# Patient Record
Sex: MALE | Race: WHITE | NOT HISPANIC OR LATINO | Employment: FULL TIME | URBAN - METROPOLITAN AREA
[De-identification: names, ages, dates, MRNs, and addresses within clinical notes are randomized per-mention and may not be internally consistent; named-entity substitution may affect disease eponyms.]

---

## 2020-06-26 ENCOUNTER — HOSPITAL ENCOUNTER (INPATIENT)
Facility: HOSPITAL | Age: 55
LOS: 3 days | Discharge: HOME | DRG: 310 | End: 2020-06-29
Attending: EMERGENCY MEDICINE | Admitting: INTERNAL MEDICINE
Payer: COMMERCIAL

## 2020-06-26 ENCOUNTER — APPOINTMENT (EMERGENCY)
Dept: RADIOLOGY | Facility: HOSPITAL | Age: 55
DRG: 310 | End: 2020-06-26
Payer: COMMERCIAL

## 2020-06-26 ENCOUNTER — OFFICE VISIT (OUTPATIENT)
Dept: URGENT CARE | Facility: CLINIC | Age: 55
End: 2020-06-26
Payer: COMMERCIAL

## 2020-06-26 VITALS
HEART RATE: 156 BPM | TEMPERATURE: 96.3 F | SYSTOLIC BLOOD PRESSURE: 138 MMHG | DIASTOLIC BLOOD PRESSURE: 77 MMHG | RESPIRATION RATE: 22 BRPM | OXYGEN SATURATION: 98 %

## 2020-06-26 DIAGNOSIS — I48.91 RAPID ATRIAL FIBRILLATION (HCC): Primary | ICD-10-CM

## 2020-06-26 DIAGNOSIS — R11.0 NAUSEA: ICD-10-CM

## 2020-06-26 DIAGNOSIS — R42 DIZZINESS: ICD-10-CM

## 2020-06-26 DIAGNOSIS — I48.91 ATRIAL FIBRILLATION WITH RAPID VENTRICULAR RESPONSE (HCC): Primary | ICD-10-CM

## 2020-06-26 PROBLEM — R79.89 ELEVATED SERUM CREATININE: Status: ACTIVE | Noted: 2020-06-26

## 2020-06-26 PROBLEM — I10 HYPERTENSION: Status: ACTIVE | Noted: 2020-06-26

## 2020-06-26 PROBLEM — E78.5 HYPERLIPIDEMIA: Status: ACTIVE | Noted: 2020-06-26

## 2020-06-26 PROBLEM — D72.829 LEUKOCYTOSIS: Status: ACTIVE | Noted: 2020-06-26

## 2020-06-26 LAB
ALBUMIN SERPL BCP-MCNC: 4.4 G/DL (ref 3.5–5)
ALP SERPL-CCNC: 78 U/L (ref 46–116)
ALT SERPL W P-5'-P-CCNC: 45 U/L (ref 12–78)
AMPHETAMINES SERPL QL SCN: NEGATIVE
ANION GAP SERPL CALCULATED.3IONS-SCNC: 13 MMOL/L (ref 4–13)
APTT PPP: 25 SECONDS (ref 23–37)
AST SERPL W P-5'-P-CCNC: 21 U/L (ref 5–45)
BACTERIA UR QL AUTO: ABNORMAL /HPF
BARBITURATES UR QL: NEGATIVE
BASOPHILS # BLD AUTO: 0.04 THOUSANDS/ΜL (ref 0–0.1)
BASOPHILS NFR BLD AUTO: 0 % (ref 0–1)
BENZODIAZ UR QL: NEGATIVE
BILIRUB SERPL-MCNC: 1.3 MG/DL (ref 0.2–1)
BILIRUB UR QL STRIP: NEGATIVE
BUN SERPL-MCNC: 23 MG/DL (ref 5–25)
CALCIUM SERPL-MCNC: 9.4 MG/DL (ref 8.3–10.1)
CHLORIDE SERPL-SCNC: 105 MMOL/L (ref 100–108)
CLARITY UR: CLEAR
CO2 SERPL-SCNC: 24 MMOL/L (ref 21–32)
COCAINE UR QL: NEGATIVE
COLOR UR: YELLOW
CREAT SERPL-MCNC: 1.66 MG/DL (ref 0.6–1.3)
EOSINOPHIL # BLD AUTO: 0.11 THOUSAND/ΜL (ref 0–0.61)
EOSINOPHIL NFR BLD AUTO: 1 % (ref 0–6)
ERYTHROCYTE [DISTWIDTH] IN BLOOD BY AUTOMATED COUNT: 12.4 % (ref 11.6–15.1)
ETHANOL SERPL-MCNC: <3 MG/DL (ref 0–3)
GFR SERPL CREATININE-BSD FRML MDRD: 47 ML/MIN/1.73SQ M
GLUCOSE SERPL-MCNC: 150 MG/DL (ref 65–140)
GLUCOSE UR STRIP-MCNC: NEGATIVE MG/DL
HCT VFR BLD AUTO: 48.7 % (ref 36.5–49.3)
HGB BLD-MCNC: 16.2 G/DL (ref 12–17)
HGB UR QL STRIP.AUTO: ABNORMAL
HYALINE CASTS #/AREA URNS LPF: ABNORMAL /LPF
IMM GRANULOCYTES # BLD AUTO: 0.1 THOUSAND/UL (ref 0–0.2)
IMM GRANULOCYTES NFR BLD AUTO: 1 % (ref 0–2)
INR PPP: 0.98 (ref 0.84–1.19)
KETONES UR STRIP-MCNC: ABNORMAL MG/DL
LEUKOCYTE ESTERASE UR QL STRIP: NEGATIVE
LYMPHOCYTES # BLD AUTO: 2.4 THOUSANDS/ΜL (ref 0.6–4.47)
LYMPHOCYTES NFR BLD AUTO: 17 % (ref 14–44)
MAGNESIUM SERPL-MCNC: 2 MG/DL (ref 1.6–2.6)
MCH RBC QN AUTO: 29.4 PG (ref 26.8–34.3)
MCHC RBC AUTO-ENTMCNC: 33.3 G/DL (ref 31.4–37.4)
MCV RBC AUTO: 88 FL (ref 82–98)
METHADONE UR QL: NEGATIVE
MONOCYTES # BLD AUTO: 0.68 THOUSAND/ΜL (ref 0.17–1.22)
MONOCYTES NFR BLD AUTO: 5 % (ref 4–12)
MUCOUS THREADS UR QL AUTO: ABNORMAL
NEUTROPHILS # BLD AUTO: 10.98 THOUSANDS/ΜL (ref 1.85–7.62)
NEUTS SEG NFR BLD AUTO: 76 % (ref 43–75)
NITRITE UR QL STRIP: NEGATIVE
NON-SQ EPI CELLS URNS QL MICRO: ABNORMAL /HPF
NRBC BLD AUTO-RTO: 0 /100 WBCS
NT-PROBNP SERPL-MCNC: 3724 PG/ML
OPIATES UR QL SCN: NEGATIVE
PCP UR QL: NEGATIVE
PH UR STRIP.AUTO: 6 [PH]
PLATELET # BLD AUTO: 252 THOUSANDS/UL (ref 149–390)
PMV BLD AUTO: 11.4 FL (ref 8.9–12.7)
POTASSIUM SERPL-SCNC: 4.3 MMOL/L (ref 3.5–5.3)
PROT SERPL-MCNC: 7.8 G/DL (ref 6.4–8.2)
PROT UR STRIP-MCNC: NEGATIVE MG/DL
PROTHROMBIN TIME: 13.3 SECONDS (ref 11.6–14.5)
RBC # BLD AUTO: 5.51 MILLION/UL (ref 3.88–5.62)
RBC #/AREA URNS AUTO: ABNORMAL /HPF
SODIUM SERPL-SCNC: 142 MMOL/L (ref 136–145)
SP GR UR STRIP.AUTO: 1.02 (ref 1–1.03)
THC UR QL: NEGATIVE
TROPONIN I SERPL-MCNC: <0.02 NG/ML
TROPONIN I SERPL-MCNC: <0.02 NG/ML
TSH SERPL DL<=0.05 MIU/L-ACNC: 3 UIU/ML (ref 0.36–3.74)
UROBILINOGEN UR QL STRIP.AUTO: 0.2 E.U./DL
WBC # BLD AUTO: 14.31 THOUSAND/UL (ref 4.31–10.16)
WBC #/AREA URNS AUTO: ABNORMAL /HPF

## 2020-06-26 PROCEDURE — 99285 EMERGENCY DEPT VISIT HI MDM: CPT

## 2020-06-26 PROCEDURE — 70450 CT HEAD/BRAIN W/O DYE: CPT

## 2020-06-26 PROCEDURE — 84484 ASSAY OF TROPONIN QUANT: CPT | Performed by: INTERNAL MEDICINE

## 2020-06-26 PROCEDURE — 83735 ASSAY OF MAGNESIUM: CPT | Performed by: EMERGENCY MEDICINE

## 2020-06-26 PROCEDURE — 96375 TX/PRO/DX INJ NEW DRUG ADDON: CPT

## 2020-06-26 PROCEDURE — 36415 COLL VENOUS BLD VENIPUNCTURE: CPT | Performed by: EMERGENCY MEDICINE

## 2020-06-26 PROCEDURE — 96376 TX/PRO/DX INJ SAME DRUG ADON: CPT

## 2020-06-26 PROCEDURE — 99291 CRITICAL CARE FIRST HOUR: CPT | Performed by: EMERGENCY MEDICINE

## 2020-06-26 PROCEDURE — 96365 THER/PROPH/DIAG IV INF INIT: CPT

## 2020-06-26 PROCEDURE — 83880 ASSAY OF NATRIURETIC PEPTIDE: CPT | Performed by: EMERGENCY MEDICINE

## 2020-06-26 PROCEDURE — 93005 ELECTROCARDIOGRAM TRACING: CPT

## 2020-06-26 PROCEDURE — 71045 X-RAY EXAM CHEST 1 VIEW: CPT

## 2020-06-26 PROCEDURE — 96361 HYDRATE IV INFUSION ADD-ON: CPT

## 2020-06-26 PROCEDURE — 80320 DRUG SCREEN QUANTALCOHOLS: CPT | Performed by: EMERGENCY MEDICINE

## 2020-06-26 PROCEDURE — 85610 PROTHROMBIN TIME: CPT | Performed by: EMERGENCY MEDICINE

## 2020-06-26 PROCEDURE — 96372 THER/PROPH/DIAG INJ SC/IM: CPT

## 2020-06-26 PROCEDURE — 85730 THROMBOPLASTIN TIME PARTIAL: CPT | Performed by: EMERGENCY MEDICINE

## 2020-06-26 PROCEDURE — 84443 ASSAY THYROID STIM HORMONE: CPT | Performed by: EMERGENCY MEDICINE

## 2020-06-26 PROCEDURE — 80053 COMPREHEN METABOLIC PANEL: CPT | Performed by: EMERGENCY MEDICINE

## 2020-06-26 PROCEDURE — 85025 COMPLETE CBC W/AUTO DIFF WBC: CPT | Performed by: EMERGENCY MEDICINE

## 2020-06-26 PROCEDURE — 84484 ASSAY OF TROPONIN QUANT: CPT | Performed by: EMERGENCY MEDICINE

## 2020-06-26 PROCEDURE — 80307 DRUG TEST PRSMV CHEM ANLYZR: CPT | Performed by: INTERNAL MEDICINE

## 2020-06-26 PROCEDURE — 99223 1ST HOSP IP/OBS HIGH 75: CPT | Performed by: INTERNAL MEDICINE

## 2020-06-26 PROCEDURE — 96366 THER/PROPH/DIAG IV INF ADDON: CPT

## 2020-06-26 PROCEDURE — 99213 OFFICE O/P EST LOW 20 MIN: CPT | Performed by: NURSE PRACTITIONER

## 2020-06-26 PROCEDURE — 81001 URINALYSIS AUTO W/SCOPE: CPT | Performed by: INTERNAL MEDICINE

## 2020-06-26 RX ORDER — DILTIAZEM HYDROCHLORIDE 5 MG/ML
15 INJECTION INTRAVENOUS ONCE
Status: COMPLETED | OUTPATIENT
Start: 2020-06-26 | End: 2020-06-26

## 2020-06-26 RX ORDER — ROSUVASTATIN CALCIUM 40 MG/1
20 TABLET, COATED ORAL DAILY
COMMUNITY
End: 2022-07-01 | Stop reason: SDUPTHER

## 2020-06-26 RX ORDER — ONDANSETRON 2 MG/ML
4 INJECTION INTRAMUSCULAR; INTRAVENOUS EVERY 6 HOURS PRN
Status: DISCONTINUED | OUTPATIENT
Start: 2020-06-26 | End: 2020-06-29 | Stop reason: HOSPADM

## 2020-06-26 RX ORDER — ONDANSETRON 2 MG/ML
4 INJECTION INTRAMUSCULAR; INTRAVENOUS ONCE
Status: COMPLETED | OUTPATIENT
Start: 2020-06-26 | End: 2020-06-26

## 2020-06-26 RX ORDER — DIPHENOXYLATE HYDROCHLORIDE AND ATROPINE SULFATE 2.5; .025 MG/1; MG/1
1 TABLET ORAL DAILY
COMMUNITY

## 2020-06-26 RX ORDER — LISINOPRIL 10 MG/1
10 TABLET ORAL DAILY
COMMUNITY
End: 2020-06-29 | Stop reason: HOSPADM

## 2020-06-26 RX ORDER — ATORVASTATIN CALCIUM 80 MG/1
80 TABLET, FILM COATED ORAL
Status: DISCONTINUED | OUTPATIENT
Start: 2020-06-27 | End: 2020-06-27

## 2020-06-26 RX ORDER — ATORVASTATIN CALCIUM 80 MG/1
80 TABLET, FILM COATED ORAL
Status: DISCONTINUED | OUTPATIENT
Start: 2020-06-26 | End: 2020-06-26

## 2020-06-26 RX ORDER — ROSUVASTATIN CALCIUM 40 MG/1
40 TABLET, COATED ORAL DAILY
COMMUNITY
End: 2020-11-13

## 2020-06-26 RX ORDER — ACETAMINOPHEN 325 MG/1
650 TABLET ORAL EVERY 6 HOURS PRN
Status: DISCONTINUED | OUTPATIENT
Start: 2020-06-26 | End: 2020-06-29 | Stop reason: HOSPADM

## 2020-06-26 RX ORDER — LISINOPRIL 10 MG/1
10 TABLET ORAL DAILY
COMMUNITY
End: 2020-11-13

## 2020-06-26 RX ADMIN — ENOXAPARIN SODIUM 100 MG: 100 INJECTION SUBCUTANEOUS at 16:18

## 2020-06-26 RX ADMIN — ONDANSETRON 4 MG: 2 INJECTION INTRAMUSCULAR; INTRAVENOUS at 14:48

## 2020-06-26 RX ADMIN — DILTIAZEM HYDROCHLORIDE 5 MG/HR: 5 INJECTION INTRAVENOUS at 19:26

## 2020-06-26 RX ADMIN — DILTIAZEM HYDROCHLORIDE 15 MG: 5 INJECTION INTRAVENOUS at 15:00

## 2020-06-26 RX ADMIN — DILTIAZEM HYDROCHLORIDE 5 MG/HR: 5 INJECTION INTRAVENOUS at 15:47

## 2020-06-26 RX ADMIN — SODIUM CHLORIDE 1000 ML: 0.9 INJECTION, SOLUTION INTRAVENOUS at 14:48

## 2020-06-26 RX ADMIN — Medication 5 MG/HR: at 19:14

## 2020-06-26 NOTE — PLAN OF CARE
Problem: Potential for Falls  Goal: Patient will remain free of falls  Description  INTERVENTIONS:  - Assess patient frequently for physical needs  -  Identify cognitive and physical deficits and behaviors that affect risk of falls    -  [place] fall precautions as indicated by assessment   - Educate patient/family on patient safety including physical limitations  - Instruct patient to call for assistance with activity based on assessment  - Modify environment to reduce risk of injury  - Consider OT/PT consult to assist with strengthening/mobility  Outcome: Progressing     Problem: PAIN - ADULT  Goal: Verbalizes/displays adequate comfort level or baseline comfort level  Description  Interventions:  - Encourage patient to monitor pain and request assistance  - Assess pain using appropriate pain scale  - Administer analgesics based on type and severity of pain and evaluate response  - Implement non-pharmacological measures as appropriate and evaluate response  - Consider cultural and social influences on pain and pain management  - Notify physician/advanced practitioner if interventions unsuccessful or patient reports new pain  Outcome: Progressing     Problem: INFECTION - ADULT  Goal: Absence or prevention of progression during hospitalization  Description  INTERVENTIONS:  - Assess and monitor for signs and symptoms of infection  - Monitor lab/diagnostic results  - Monitor all insertion sites, i e  indwelling lines, tubes, and drains  - Monitor endotracheal if appropriate and nasal secretions for changes in amount and color  - [place] appropriate cooling/warming therapies per order  - Administer medications as ordered  - Instruct and encourage patient and family to use good hand hygiene technique  - Identify and instruct in appropriate isolation precautions for identified infection/condition  Outcome: Progressing  Goal: Absence of fever/infection during neutropenic period  Description  INTERVENTIONS:  - Monitor WBC    Outcome: Progressing     Problem: SAFETY ADULT  Goal: Maintain or return to baseline ADL function  Description  INTERVENTIONS:  -  Assess patient's ability to carry out ADLs; assess patient's baseline for ADL function and identify physical deficits which impact ability to perform ADLs (bathing, care of mouth/teeth, toileting, grooming, dressing, etc )  - Assess/evaluate cause of self-care deficits   - Assess range of motion  - Assess patient's mobility; develop plan if impaired  - Assess patient's need for assistive devices and provide as appropriate  - Encourage maximum independence but intervene and supervise when necessary  - Involve family in performance of ADLs  - Assess for home care needs following discharge   - Consider OT consult to assist with ADL evaluation and planning for discharge  - Provide patient education as appropriate  Outcome: Progressing  Goal: Maintain or return mobility status to optimal level  Description  INTERVENTIONS:  - Assess patient's baseline mobility status (ambulation, transfers, stairs, etc )    - Identify cognitive and physical deficits and behaviors that affect mobility  - Identify mobility aids required to assist with transfers and/or ambulation (gait belt, sit-to-stand, lift, walker, cane, etc )  - [place] fall precautions as indicated by assessment  - Record patient progress and toleration of activity level on Mobility SBAR; progress patient to next Phase/Stage  - Instruct patient to call for assistance with activity based on assessment  - Consider rehabilitation consult to assist with strengthening/weightbearing, etc   Outcome: Progressing     Problem: DISCHARGE PLANNING  Goal: Discharge to home or other facility with appropriate resources  Description  INTERVENTIONS:  - Identify barriers to discharge w/patient and caregiver  - Arrange for needed discharge resources and transportation as appropriate  - Identify discharge learning needs (meds, wound care, etc )  - Arrange for interpretive services to assist at discharge as needed  - Refer to Case Management Department for coordinating discharge planning if the patient needs post-hospital services based on physician/advanced practitioner order or complex needs related to functional status, cognitive ability, or social support system  Outcome: Progressing     Problem: Knowledge Deficit  Goal: Patient/family/caregiver demonstrates understanding of disease process, treatment plan, medications, and discharge instructions  Description  Complete learning assessment and assess knowledge base    Interventions:  - Provide teaching at level of understanding  - Provide teaching via preferred learning methods  Outcome: Progressing     Problem: CARDIOVASCULAR - ADULT  Goal: Maintains optimal cardiac output and hemodynamic stability  Description  INTERVENTIONS:  - Monitor I/O, vital signs and rhythm  - Monitor for S/S and trends of decreased cardiac output  - Administer and titrate ordered vasoactive medications to optimize hemodynamic stability  - Assess quality of pulses, skin color and temperature  - Assess for signs of decreased coronary artery perfusion  - Instruct patient to report change in severity of symptoms  Outcome: Progressing  Goal: Absence of cardiac dysrhythmias or at baseline rhythm  Description  INTERVENTIONS:  - Continuous cardiac monitoring, vital signs, obtain 12 lead EKG if ordered  - Administer antiarrhythmic and heart rate control medications as ordered  - Monitor electrolytes and administer replacement therapy as ordered  Outcome: Progressing

## 2020-06-26 NOTE — ED PROVIDER NOTES
History  Chief Complaint   Patient presents with    Rapid Heart Rate     Pt arrives with BLS from care now, denies any CP hx of HTN  61-year-old male presents with lightheadedness and not feeling well generalized weakness from an urgent care  Patient says he started with the symptoms yesterday and he was lightheaded and dizzy especially when he stood up  Associated nausea  Denies chest pain pleurisy shortness of breath fevers chills cough congestion or any other symptoms  When he went to urgent care today patient was found to be in rapid AFib so sent to the ED for further evaluation management  He has history of hypertension however no history of atrial fibrillation no recent surgeries no calf pain tenderness no swelling no travel history no other symptoms at this time  History provided by:  Patient   used: No        Prior to Admission Medications   Prescriptions Last Dose Informant Patient Reported? Taking?   lisinopril (ZESTRIL) 10 mg tablet 6/26/2020 at Unknown time Spouse/Significant Other Yes Yes   Sig: Take 10 mg by mouth daily   multivitamin (THERAGRAN) TABS 6/26/2020 at Unknown time  Yes Yes   Sig: Take 1 tablet by mouth daily   rosuvastatin (CRESTOR) 40 MG tablet 6/26/2020 at Unknown time Spouse/Significant Other Yes Yes   Sig: Take 40 mg by mouth daily      Facility-Administered Medications: None       Past Medical History:   Diagnosis Date    Hyperlipidemia     Hypertension        Past Surgical History:   Procedure Laterality Date    HERNIA REPAIR      KNEE SURGERY      TONSILLECTOMY         Family History   Problem Relation Age of Onset    Heart disease Father      I have reviewed and agree with the history as documented      E-Cigarette/Vaping    E-Cigarette Use Never User      E-Cigarette/Vaping Substances     Social History     Tobacco Use    Smoking status: Former Smoker     Packs/day: 1 00     Years: 6 00     Pack years: 6 00     Types: Cigarettes     Last attempt to quit: 2000     Years since quittin 0    Smokeless tobacco: Never Used    Tobacco comment: 15 years ago   Substance Use Topics    Alcohol use: Never     Frequency: Never     Comment: soc    Drug use: Never       Review of Systems   Constitutional: Negative  HENT: Negative  Eyes: Negative  Respiratory: Negative  Cardiovascular: Negative  Gastrointestinal: Negative  Endocrine: Negative  Genitourinary: Negative  Musculoskeletal: Negative  Skin: Negative  Allergic/Immunologic: Negative  Neurological: Positive for light-headedness  Hematological: Negative  Psychiatric/Behavioral: Negative  All other systems reviewed and are negative  Physical Exam  Physical Exam   Constitutional: He is oriented to person, place, and time  He appears well-developed and well-nourished  HENT:   Head: Normocephalic and atraumatic  Eyes: Pupils are equal, round, and reactive to light  EOM are normal    Neck: Normal range of motion  Neck supple  Cardiovascular: Normal heart sounds and intact distal pulses  Exam reveals no gallop and no friction rub  No murmur heard  Tachycardia irregularly rhythm   Pulmonary/Chest: Effort normal and breath sounds normal    Abdominal: Soft  Bowel sounds are normal    Musculoskeletal: Normal range of motion  Neurological: He is alert and oriented to person, place, and time  Skin: Skin is warm and dry  Psychiatric: He has a normal mood and affect  Nursing note and vitals reviewed        Vital Signs  ED Triage Vitals   Temperature Pulse Respirations Blood Pressure SpO2   20 1441 20 1440 20 1440 20 1440 20 1440   97 5 °F (36 4 °C) (!) 150 18 125/82 98 %      Temp Source Heart Rate Source Patient Position - Orthostatic VS BP Location FiO2 (%)   20 1441 20 1440 20 1440 20 1440 --   Tympanic Monitor Lying Left arm       Pain Score       20 1440       No Pain Vitals:    06/26/20 1835 06/26/20 1918 06/26/20 1923 06/26/20 2114   BP: 117/78  117/78 107/81   Pulse: 78 95 77 75   Patient Position - Orthostatic VS:             Visual Acuity      ED Medications  Medications   ondansetron (ZOFRAN) injection 4 mg (has no administration in time range)   acetaminophen (TYLENOL) tablet 650 mg (has no administration in time range)   enoxaparin (LOVENOX) subcutaneous injection 100 mg (has no administration in time range)   atorvastatin (LIPITOR) tablet 80 mg (has no administration in time range)   diltiazem (CARDIZEM) 125 mg in sodium chloride 0 9 % 125 mL infusion (5 mg/hr Intravenous New Bag 6/26/20 1926)   diltiazem (CARDIZEM) injection 15 mg (15 mg Intravenous Given 6/26/20 1500)   ondansetron (ZOFRAN) injection 4 mg (4 mg Intravenous Given 6/26/20 1448)   sodium chloride 0 9 % bolus 1,000 mL (0 mL Intravenous Stopped 6/26/20 1555)   diltiazem (CARDIZEM) 125 mg in sodium chloride 0 9 % 125 mL infusion (0 mg/hr Intravenous Stopped 6/26/20 1913)   enoxaparin (LOVENOX) subcutaneous injection 100 mg (100 mg Subcutaneous Given 6/26/20 1618)   diltiazem (CARDIZEM) 125 mg in sodium chloride 0 9 % 125 mL infusion (0 mg/hr Intravenous Stopped 6/26/20 1926)       Diagnostic Studies  Results Reviewed     Procedure Component Value Units Date/Time    Rapid drug screen, urine [[id number]]  (Normal) Collected:  06/26/20 2014    Lab Status:  Final result Specimen:  Urine, Clean Catch Updated:  06/26/20 2039     Amph/Meth UR Negative     Barbiturate Ur Negative     Benzodiazepine Urine Negative     Cocaine Urine Negative     Methadone Urine Negative     Opiate Urine Negative     PCP Ur Negative     THC Urine Negative    Narrative:       FOR MEDICAL PURPOSES ONLY  IF CONFIRMATION NEEDED PLEASE CONTACT THE LAB WITHIN 5 DAYS      Drug Screen Cutoff Levels:  AMPHETAMINE/METHAMPHETAMINES  1000 ng/mL  BARBITURATES     200 ng/mL  BENZODIAZEPINES     200 ng/mL  COCAINE      300 ng/mL  METHADONE      300 ng/mL  OPIATES      300 ng/mL  PHENCYCLIDINE     25 ng/mL  THC       50 ng/mL      UA (URINE) with reflex to Scope [[id number]]  (Abnormal) Collected:  06/26/20 2014    Lab Status:  Final result Specimen:  Urine, Clean Catch Updated:  06/26/20 2024     Color, UA Yellow     Clarity, UA Clear     Specific Gravity, UA 1 025     pH, UA 6 0     Leukocytes, UA Negative     Nitrite, UA Negative     Protein, UA Negative mg/dl      Glucose, UA Negative mg/dl      Ketones, UA 40 (2+) mg/dl      Urobilinogen, UA 0 2 E U /dl      Bilirubin, UA Negative     Blood, UA Trace-Intact    TSH [[id number]]  (Normal) Collected:  06/26/20 1448    Lab Status:  Final result Specimen:  Blood from Arm, Right Updated:  06/26/20 1531     TSH 3RD GENERATON 2 998 uIU/mL     Narrative:       Patients undergoing fluorescein dye angiography may retain small amounts of fluorescein in the body for 48-72 hours post procedure  Samples containing fluorescein can produce falsely depressed TSH values  If the patient had this procedure,a specimen should be resubmitted post fluorescein clearance        Magnesium [[id number]]  (Normal) Collected:  06/26/20 1448    Lab Status:  Final result Specimen:  Blood from Arm, Right Updated:  06/26/20 1531     Magnesium 2 0 mg/dL     NT-BNP PRO [[id number]]  (Abnormal) Collected:  06/26/20 1448    Lab Status:  Final result Specimen:  Blood from Arm, Right Updated:  06/26/20 1531     NT-proBNP 3,724 pg/mL     Troponin I [[id number]]  (Normal) Collected:  06/26/20 1448    Lab Status:  Final result Specimen:  Blood from Arm, Right Updated:  06/26/20 1528     Troponin I <0 02 ng/mL     Comprehensive metabolic panel [[id number]]  (Abnormal) Collected:  06/26/20 1448    Lab Status:  Final result Specimen:  Blood from Arm, Right Updated:  06/26/20 1523     Sodium 142 mmol/L      Potassium 4 3 mmol/L      Chloride 105 mmol/L      CO2 24 mmol/L      ANION GAP 13 mmol/L      BUN 23 mg/dL      Creatinine 1 66 mg/dL      Glucose 150 mg/dL      Calcium 9 4 mg/dL      AST 21 U/L      ALT 45 U/L      Alkaline Phosphatase 78 U/L      Total Protein 7 8 g/dL      Albumin 4 4 g/dL      Total Bilirubin 1 30 mg/dL      eGFR 47 ml/min/1 73sq m     Narrative:       [place] guidelines for Chronic Kidney Disease (CKD):     Stage 1 with normal or high GFR (GFR > 90 mL/min/1 73 square meters)    Stage 2 Mild CKD (GFR = 60-89 mL/min/1 73 square meters)    Stage 3A Moderate CKD (GFR = 45-59 mL/min/1 73 square meters)    Stage 3B Moderate CKD (GFR = 30-44 mL/min/1 73 square meters)    Stage 4 Severe CKD (GFR = 15-29 mL/min/1 73 square meters)    Stage 5 End Stage CKD (GFR <15 mL/min/1 73 square meters)  Note: GFR calculation is accurate only with a steady state creatinine    Protime-INR [[id number]]  (Normal) Collected:  06/26/20 1448    Lab Status:  Final result Specimen:  Blood from Arm, Right Updated:  06/26/20 1520     Protime 13 3 seconds      INR 0 98    APTT [[id number]]  (Normal) Collected:  06/26/20 1448    Lab Status:  Final result Specimen:  Blood from Arm, Right Updated:  06/26/20 1520     PTT 25 seconds     Ethanol [[id number]]  (Normal) Collected:  06/26/20 1448    Lab Status:  Final result Specimen:  Blood from Arm, Right Updated:  06/26/20 1520     Ethanol Lvl <3 mg/dL     CBC and differential [[id number]]  (Abnormal) Collected:  06/26/20 1448    Lab Status:  Final result Specimen:  Blood from Arm, Right Updated:  06/26/20 1505     WBC 14 31 Thousand/uL      RBC 5 51 Million/uL      Hemoglobin 16 2 g/dL      Hematocrit 48 7 %      MCV 88 fL      MCH 29 4 pg      MCHC 33 3 g/dL      RDW 12 4 %      MPV 11 4 fL      Platelets 519 Thousands/uL      nRBC 0 /100 WBCs      Neutrophils Relative 76 %      Immat GRANS % 1 %      Lymphocytes Relative 17 %      Monocytes Relative 5 %      Eosinophils Relative 1 %      Basophils Relative 0 %      Neutrophils Absolute 10 98 Thousands/µL      Immature Grans Absolute 0 10 Thousand/uL      Lymphocytes Absolute 2 40 Thousands/µL      Monocytes Absolute 0 68 Thousand/µL      Eosinophils Absolute 0 11 Thousand/µL      Basophils Absolute 0 04 Thousands/µL                  XR chest 1 view portable   Final Result by Triston Yun MD (06/26 1737)      No acute cardiopulmonary disease  Workstation performed: [id number]         CT head without contrast   Final Result by María Rojo MD (06/26 1526)      No acute intracranial abnormality                    Workstation performed: [id number]                    Procedures  ECG 12 Lead Documentation Only  Date/Time: 6/26/2020 2:41 PM  Performed by: Vick Hensley DO  Authorized by: Vick Hensley DO     ECG reviewed by me, the ED Provider: yes    Patient location:  ED  Previous ECG:     Previous ECG:  Unavailable    Comparison to cardiac monitor: Yes    Interpretation:     Interpretation: abnormal    Rate:     ECG rate assessment: tachycardic    Rhythm:     Rhythm: atrial fibrillation    Ectopy:     Ectopy: none    QRS:     QRS axis:  Normal  Conduction:     Conduction: normal    ST segments:     ST segments:  Non-specific  T waves:     T waves: non-specific    CriticalCare Time  Performed by: Vick Hensley DO  Authorized by: Vick Hensley DO     Critical care provider statement:     Critical care time (minutes):  45    Critical care was necessary to treat or prevent imminent or life-threatening deterioration of the following conditions:  Circulatory failure    Critical care was time spent personally by me on the following activities:  Blood draw for specimens, obtaining history from patient or surrogate, development of treatment plan with patient or surrogate, discussions with consultants, evaluation of patient's response to treatment, examination of patient, ordering and performing treatments and interventions, interpretation of cardiac output measurements, ordering and review of laboratory studies, ordering and review of radiographic studies, re-evaluation of patient's condition and review of old charts             ED Course                                             MDM  Number of Diagnoses or Management Options  Atrial fibrillation with rapid ventricular response [facility]):      Amount and/or Complexity of Data Reviewed  Clinical lab tests: ordered and reviewed  Tests in the radiology section of CPT®: reviewed and ordered  Tests in the medicine section of CPT®: ordered and reviewed    Patient Progress  Patient progress: stable        Disposition  Final diagnoses:   Atrial fibrillation with rapid ventricular response ([address] )     Time reflects when diagnosis was documented in both MDM as applicable and the Disposition within this note     Time User Action Codes Description Comment    6/26/2020  5:38 PM Melodie Pacheco Add [I48 91] Atrial fibrillation with rapid ventricular response [facility])       ED Disposition     ED Disposition Condition Date/Time Comment    Admit Stable Fri Jun 26, 2020  5:38 PM          Follow-up Information    None         Current Discharge Medication List      CONTINUE these medications which have NOT CHANGED    Details   lisinopril (ZESTRIL) 10 mg tablet Take 10 mg by mouth daily      multivitamin (THERAGRAN) TABS Take 1 tablet by mouth daily      rosuvastatin (CRESTOR) 40 MG tablet Take 40 mg by mouth daily           No discharge procedures on file      PDMP Review     None          ED Provider  Electronically Signed by           Bri Myers DO  06/26/20 6059       Melodie Pacheco,   07/01/20 6849

## 2020-06-26 NOTE — ED NOTES
Pt is resting, Dr Farideh Rojas at bedside to re-eval  Cardizem drip is running at 10 mg/hr        Geronimo Mcgraw, JOAQUIN  06/26/20 7234

## 2020-06-26 NOTE — ASSESSMENT & PLAN NOTE
Likely secondary to atrial fibrillation with rapid ventricular rate  CT scan of the brain was negative  If persistent dizziness with control of heart rate might need to consider MRI  Continue to monitor on telemetry  Check 2D echo

## 2020-06-26 NOTE — ASSESSMENT & PLAN NOTE
Unknown baseline  Hold lisinopril  Monitor urine output  Avoid nephrotoxic agents  Follow-up BMP in a m

## 2020-06-26 NOTE — ED NOTES
Patient states that he feels " so much better"  States that as soon as he got the first medication he felt much better          Manuel Gaspar RN  06/26/20 1800

## 2020-06-26 NOTE — ED NOTES
Patient Sent in by [facility] now for new onset rapid a-fib  Patient initially complained of dizziness and " just not feeling well" States that it was first noticed after he woke up yesterday morning          Bekah Solares RN  06/26/20 1800

## 2020-06-26 NOTE — ASSESSMENT & PLAN NOTE
Patient present to the emergency room complaining of dizziness since yesterday morning and not feeling well which has gotten progressively worse today  Patient noted to be in atrial fibrillation with rapid ventricular rate in the ED  Patient received 15 milligram Cardizem bolus in the ED and was started on Cardizem drip    Continue Cardizem drip at 5 milligram/hour  Patient's initial troponin was negative  TSH was normal  Cannot rule out obstructive sleep apnea-patient will need overnight sleep study  Monitor serial troponins  Patient will be on anticoagulation with Lovenox  Check 2D echo  Cardiology evaluation

## 2020-06-26 NOTE — H&P
H&P- Lady Doctor 1965, 48 y o  male MRN: [id number]    Unit/Bed#: [address] [place] [address] Encounter: [id number]    Primary Care Provider: No primary care provider on file  Date and time admitted to hospital: 6/26/2020  2:38 PM        Atrial fibrillation with rapid ventricular response [facility])  Assessment & Plan  Patient present to the emergency room complaining of dizziness since yesterday morning and not feeling well which has gotten progressively worse today  Patient noted to be in atrial fibrillation with rapid ventricular rate in the ED  Patient received 15 milligram Cardizem bolus in the ED and was started on Cardizem drip  Continue Cardizem drip at 5 milligram/hour  Patient's initial troponin was negative  TSH was normal  Cannot rule out obstructive sleep apnea-patient will need overnight sleep study  Monitor serial troponins  Patient will be on anticoagulation with Lovenox  Check 2D echo  Cardiology evaluation    Dizziness  Assessment & Plan  Likely secondary to atrial fibrillation with rapid ventricular rate  CT scan of the brain was negative  If persistent dizziness with control of heart rate might need to consider MRI  Continue to monitor on telemetry  Check 2D echo    Hypertension  Assessment & Plan  Patient is on lisinopril which will be held at the current time  Patient will be on Cardizem drip    Hyperlipidemia  Assessment & Plan  Continue statin-Crestor to be substituted with Lipitor    Elevated serum creatinine  Assessment & Plan  Unknown baseline  Hold lisinopril  Monitor urine output  Avoid nephrotoxic agents  Follow-up BMP in a m  Leukocytosis  Assessment & Plan  Likely stress response  No clinical evidence of any infection  Follow-up CBC in a m        VTE Prophylaxis: Enoxaparin (Lovenox)  / sequential compression device   Code Status:  Full code    Discussion with family: wife    Anticipated Length of Stay:  Patient will be admitted on an Inpatient basis with an anticipated length of stay of  more than 2 midnights  Justification for Hospital Stay:  Atrial fibrillation with rapid ventricular rate, dizziness  Total Time for Visit, including Counseling / Coordination of Care: 1 hour  Greater than 50% of this total time spent on direct patient counseling and coordination of care  Chief Complaint:       History of Present Illness:    Mega Zapata is a 48 y o  male with past medical history hypertension, hyperlipidemia who presents with dizziness since yesterday morning  Patient reports that he had dizziness with movement throughout yesterday and did not feel well  Patient went to work and works as a   Patient reported his dizziness was worse especially with sudden movements yesterday  Today the dizziness was really bad even without movement  Patient returned from work in the afternoon and asked his wife to check his blood pressure  His blood pressure was low and patient called his doctor who recommended the patient come into the ED  Patient denies any chest pain, palpitations, headache, tingling or numbness or weakness  Patient denies any cardiac history previously  On further questioning, wife did report that patient snores a lot  In the ED patient was tachycardic and EKG showed atrial fibrillation with rapid ventricular rate  CT scan of the brain showed no acute intracranial abnormality    Review of Systems:    Review of Systems   Constitutional: Negative for chills, diaphoresis, fatigue and unexpected weight change  HENT: Negative for congestion, ear discharge, ear pain, facial swelling, hearing loss, mouth sores, nosebleeds, postnasal drip, rhinorrhea, sinus pressure, sneezing, sore throat, tinnitus, trouble swallowing and voice change  Eyes: Negative for photophobia, discharge, redness and visual disturbance  Respiratory: Negative for cough, chest tightness, shortness of breath, wheezing and stridor      Cardiovascular: Negative for chest pain, palpitations and leg swelling  Gastrointestinal: Negative for abdominal distention, abdominal pain, anal bleeding, blood in stool, constipation, diarrhea, nausea and vomiting  Endocrine: Negative for polydipsia, polyphagia and polyuria  Genitourinary: Negative for decreased urine volume, difficulty urinating, dysuria, flank pain, frequency, hematuria and urgency  Musculoskeletal: Negative for arthralgias, back pain and neck stiffness  Skin: Negative for pallor and rash  Neurological: Positive for dizziness  Negative for seizures, facial asymmetry, speech difficulty, light-headedness, numbness and headaches  Hematological: Negative for adenopathy  Does not bruise/bleed easily  Psychiatric/Behavioral: Negative for agitation and confusion  Past Medical and Surgical History:     Past Medical History:   Diagnosis Date    Hyperlipidemia     Hypertension        Past Surgical History:   Procedure Laterality Date    HERNIA REPAIR      KNEE SURGERY      TONSILLECTOMY         Meds/Allergies:    Prior to Admission medications    Medication Sig Start Date End Date Taking? Authorizing Provider   lisinopril (ZESTRIL) 10 mg tablet Take 10 mg by mouth daily   Yes Historical Provider, MD   multivitamin (THERAGRAN) TABS Take 1 tablet by mouth daily   Yes Historical Provider, MD   rosuvastatin (CRESTOR) 40 MG tablet Take 40 mg by mouth daily   Yes Historical Provider, MD     I have reviewed home medications with patient personally      Allergies: No Known Allergies    Social History:     Marital Status: /Civil Union   Occupation:    Substance Use History:   Social History     Substance and Sexual Activity   Alcohol Use Never    Frequency: Never    Comment: soc     Social History     Tobacco Use   Smoking Status Former Smoker    Packs/day: 1 00    Years: 6 00    Pack years: 6     Types: Cigarettes    Last attempt to quit: 2000    Years since quittin 0   Smokeless Tobacco Never Used   Tobacco Comment    15 years ago     Social History     Substance and Sexual Activity   Drug Use Never       Family History:    Family History   Problem Relation Age of Onset    Heart disease Father        Physical Exam:     Vitals:   Blood Pressure: 117/78 (06/26/20 1835)  Pulse: 78 (06/26/20 1835)  Temperature: 98 2 °F (36 8 °C) (06/26/20 1835)  Temp Source: Tympanic (06/26/20 1441)  Respirations: 20 (06/26/20 1835)  Height: 5' 9" (175 3 cm) (06/26/20 1835)  Weight - Scale: 102 kg (225 lb 15 5 oz) (06/26/20 1835)  SpO2: 94 % (06/26/20 1835)    Physical Exam   Constitutional: No distress  HENT:   Head: Normocephalic and atraumatic  Eyes: Pupils are equal, round, and reactive to light  Conjunctivae are normal    Neck: Normal range of motion  Neck supple  Cardiovascular: Normal heart sounds  Irregularly irregular   Pulmonary/Chest: Effort normal  No respiratory distress  He has no wheezes  He has no rhonchi  He has no rales  He exhibits no tenderness  Abdominal: Soft  Bowel sounds are normal  He exhibits no distension  There is no tenderness  There is no rebound and no guarding  Musculoskeletal: He exhibits no edema  Neurological: He is alert  No cranial nerve deficit  Skin: Skin is warm and dry  No rash noted  Additional Data:     Lab Results: I have personally reviewed pertinent reports        Results from last 7 days   Lab Units 06/26/20  1448   WBC Thousand/uL 14 31*   HEMOGLOBIN g/dL 16 2   HEMATOCRIT % 48 7   PLATELETS Thousands/uL 252   NEUTROS PCT % 76*   LYMPHS PCT % 17   MONOS PCT % 5   EOS PCT % 1     Results from last 7 days   Lab Units 06/26/20  1448   SODIUM mmol/L 142   POTASSIUM mmol/L 4 3   CHLORIDE mmol/L 105   CO2 mmol/L 24   BUN mg/dL 23   CREATININE mg/dL 1 66*   ANION GAP mmol/L 13   CALCIUM mg/dL 9 4   ALBUMIN g/dL 4 4   TOTAL BILIRUBIN mg/dL 1 30*   ALK PHOS U/L 78   ALT U/L 45   AST U/L 21   GLUCOSE RANDOM mg/dL 150*     Results from last 7 days   Lab Units 06/26/20  1448   INR  0 98                   Imaging: I have personally reviewed pertinent reports  XR chest 1 view portable   Final Result by Leo Zamudio MD (06/26 6657)      No acute cardiopulmonary disease  Workstation performed: [id number]         CT head without contrast   Final Result by Jenney Castleman, MD (06/26 1526)      No acute intracranial abnormality  Workstation performed: [id number]             EKG, Pathology, and Other Studies Reviewed on Admission:   · EKG:  EKG shows atrial fibrillation with rapid ventricular rate with nonspecific ST-T changes    Allscripts / Epic Records Reviewed: No     ** Please Note: This note has been constructed using a voice recognition system   **

## 2020-06-27 ENCOUNTER — APPOINTMENT (INPATIENT)
Dept: NON INVASIVE DIAGNOSTICS | Facility: HOSPITAL | Age: 55
DRG: 310 | End: 2020-06-27
Payer: COMMERCIAL

## 2020-06-27 PROBLEM — D72.829 LEUKOCYTOSIS: Status: RESOLVED | Noted: 2020-06-26 | Resolved: 2020-06-27

## 2020-06-27 LAB
ANION GAP SERPL CALCULATED.3IONS-SCNC: 10 MMOL/L (ref 4–13)
ATRIAL RATE: 120 BPM
ATRIAL RATE: 159 BPM
BASOPHILS # BLD AUTO: 0.06 THOUSANDS/ΜL (ref 0–0.1)
BASOPHILS NFR BLD AUTO: 1 % (ref 0–1)
BUN SERPL-MCNC: 20 MG/DL (ref 5–25)
CALCIUM SERPL-MCNC: 7.9 MG/DL (ref 8.3–10.1)
CHLORIDE SERPL-SCNC: 106 MMOL/L (ref 100–108)
CHOLEST SERPL-MCNC: 102 MG/DL (ref 50–200)
CO2 SERPL-SCNC: 24 MMOL/L (ref 21–32)
CREAT SERPL-MCNC: 1.08 MG/DL (ref 0.6–1.3)
EOSINOPHIL # BLD AUTO: 0.19 THOUSAND/ΜL (ref 0–0.61)
EOSINOPHIL NFR BLD AUTO: 2 % (ref 0–6)
ERYTHROCYTE [DISTWIDTH] IN BLOOD BY AUTOMATED COUNT: 12.7 % (ref 11.6–15.1)
GFR SERPL CREATININE-BSD FRML MDRD: 78 ML/MIN/1.73SQ M
GLUCOSE SERPL-MCNC: 112 MG/DL (ref 65–140)
HCT VFR BLD AUTO: 43.5 % (ref 36.5–49.3)
HDLC SERPL-MCNC: 34 MG/DL
HGB BLD-MCNC: 14.3 G/DL (ref 12–17)
IMM GRANULOCYTES # BLD AUTO: 0.06 THOUSAND/UL (ref 0–0.2)
IMM GRANULOCYTES NFR BLD AUTO: 1 % (ref 0–2)
LDLC SERPL CALC-MCNC: 50 MG/DL (ref 0–100)
LYMPHOCYTES # BLD AUTO: 2.76 THOUSANDS/ΜL (ref 0.6–4.47)
LYMPHOCYTES NFR BLD AUTO: 28 % (ref 14–44)
MAGNESIUM SERPL-MCNC: 2.2 MG/DL (ref 1.6–2.6)
MCH RBC QN AUTO: 29.9 PG (ref 26.8–34.3)
MCHC RBC AUTO-ENTMCNC: 32.9 G/DL (ref 31.4–37.4)
MCV RBC AUTO: 91 FL (ref 82–98)
MONOCYTES # BLD AUTO: 0.75 THOUSAND/ΜL (ref 0.17–1.22)
MONOCYTES NFR BLD AUTO: 8 % (ref 4–12)
NEUTROPHILS # BLD AUTO: 6.09 THOUSANDS/ΜL (ref 1.85–7.62)
NEUTS SEG NFR BLD AUTO: 60 % (ref 43–75)
NONHDLC SERPL-MCNC: 68 MG/DL
NRBC BLD AUTO-RTO: 0 /100 WBCS
PLATELET # BLD AUTO: 205 THOUSANDS/UL (ref 149–390)
PMV BLD AUTO: 11.4 FL (ref 8.9–12.7)
POTASSIUM SERPL-SCNC: 3.8 MMOL/L (ref 3.5–5.3)
QRS AXIS: 28 DEGREES
QRS AXIS: 38 DEGREES
QRSD INTERVAL: 82 MS
QRSD INTERVAL: 84 MS
QT INTERVAL: 244 MS
QT INTERVAL: 294 MS
QTC INTERVAL: 396 MS
QTC INTERVAL: 455 MS
RBC # BLD AUTO: 4.79 MILLION/UL (ref 3.88–5.62)
SODIUM SERPL-SCNC: 140 MMOL/L (ref 136–145)
T WAVE AXIS: -40 DEGREES
T WAVE AXIS: 13 DEGREES
TRIGL SERPL-MCNC: 91 MG/DL
TROPONIN I SERPL-MCNC: <0.02 NG/ML
VENTRICULAR RATE: 144 BPM
VENTRICULAR RATE: 159 BPM
WBC # BLD AUTO: 9.91 THOUSAND/UL (ref 4.31–10.16)

## 2020-06-27 PROCEDURE — 99255 IP/OBS CONSLTJ NEW/EST HI 80: CPT | Performed by: INTERNAL MEDICINE

## 2020-06-27 PROCEDURE — 83735 ASSAY OF MAGNESIUM: CPT | Performed by: INTERNAL MEDICINE

## 2020-06-27 PROCEDURE — 80048 BASIC METABOLIC PNL TOTAL CA: CPT | Performed by: INTERNAL MEDICINE

## 2020-06-27 PROCEDURE — 99232 SBSQ HOSP IP/OBS MODERATE 35: CPT | Performed by: INTERNAL MEDICINE

## 2020-06-27 PROCEDURE — 93010 ELECTROCARDIOGRAM REPORT: CPT | Performed by: INTERNAL MEDICINE

## 2020-06-27 PROCEDURE — 93306 TTE W/DOPPLER COMPLETE: CPT | Performed by: INTERNAL MEDICINE

## 2020-06-27 PROCEDURE — 93306 TTE W/DOPPLER COMPLETE: CPT

## 2020-06-27 PROCEDURE — 84484 ASSAY OF TROPONIN QUANT: CPT | Performed by: INTERNAL MEDICINE

## 2020-06-27 PROCEDURE — 80061 LIPID PANEL: CPT | Performed by: NURSE PRACTITIONER

## 2020-06-27 PROCEDURE — 85025 COMPLETE CBC W/AUTO DIFF WBC: CPT | Performed by: INTERNAL MEDICINE

## 2020-06-27 RX ORDER — ATORVASTATIN CALCIUM 80 MG/1
80 TABLET, FILM COATED ORAL DAILY
Status: DISCONTINUED | OUTPATIENT
Start: 2020-06-27 | End: 2020-06-29 | Stop reason: HOSPADM

## 2020-06-27 RX ADMIN — METOPROLOL TARTRATE 37.5 MG: 25 TABLET, FILM COATED ORAL at 21:09

## 2020-06-27 RX ADMIN — DILTIAZEM HYDROCHLORIDE 5 MG/HR: 5 INJECTION INTRAVENOUS at 08:54

## 2020-06-27 RX ADMIN — ENOXAPARIN SODIUM 100 MG: 100 INJECTION SUBCUTANEOUS at 04:03

## 2020-06-27 RX ADMIN — DILTIAZEM HYDROCHLORIDE 30 MG: 30 TABLET, FILM COATED ORAL at 23:48

## 2020-06-27 RX ADMIN — METOPROLOL TARTRATE 25 MG: 25 TABLET, FILM COATED ORAL at 10:54

## 2020-06-27 RX ADMIN — DILTIAZEM HYDROCHLORIDE 30 MG: 30 TABLET, FILM COATED ORAL at 17:24

## 2020-06-27 RX ADMIN — APIXABAN 5 MG: 5 TABLET, FILM COATED ORAL at 17:24

## 2020-06-27 RX ADMIN — ATORVASTATIN CALCIUM 80 MG: 80 TABLET, FILM COATED ORAL at 08:17

## 2020-06-27 RX ADMIN — DILTIAZEM HYDROCHLORIDE 30 MG: 30 TABLET, FILM COATED ORAL at 12:02

## 2020-06-27 NOTE — UTILIZATION REVIEW
Continued Stay Review    Date: 6/27/20 Saturday                   Current Patient Class: Inpatient   Current Level of Care: Acute Med Surg with Telemetry    HPI:   48year old male with PMHx HTN, HLD  Initially admitted on  6/26/20 2nd Atrial fibrillation with rapid ventricular response on  IV Cardizem gtt     Assessment/Plan:   *Currently atrial fibrillation with heart rate around  beats per minute  being transitioned to po Cardizem and started metoprolol po  B i d  Serial Troponin negative x 3  Plan anticoagulation with Lovenox and will be transitioned to Eliquis  2D echo showed EF of 50-55%, mild-to-moderate mitral regurgitation    6/27/20 Cardiology Consult:  *Currently on the monitor atrial fibrillation with heart rate around  beats per minute  Atrial fibrillation with rapid ventricular rate  Most likely new onset  Currently heart rate is much better  Will add metoprolol and change Lovenox to Eliquis  Discussed various options including staying here and [facility] guided cardioversion on Monday or possibly discharging home if his heart rate persistently below 80 to 90 and an outpatient [facility] guided cardioversion next week      Echo Doppler shows low normal to normal LV systolic function EF 62-88%, mild mild to moderate MR with mild TR normal LV systolic function  Left atrium is mild-to-moderately dilated      Pertinent Labs/Diagnostic Results:     Results from last 7 days   Lab Units 06/27/20  0450 06/26/20  1448   WBC Thousand/uL 9 91 14 31*   HEMOGLOBIN g/dL 14 3 16 2   HEMATOCRIT % 43 5 48 7   PLATELETS Thousands/uL 205 252   NEUTROS ABS Thousands/µL 6 09 10 98*     Results from last 7 days   Lab Units 06/27/20  0450 06/26/20  1448   SODIUM mmol/L 140 142   POTASSIUM mmol/L 3 8 4 3   CHLORIDE mmol/L 106 105   CO2 mmol/L 24 24   ANION GAP mmol/L 10 13   BUN mg/dL 20 23   CREATININE mg/dL 1 08 1 66*   EGFR ml/min/1 73sq m 78 47   CALCIUM mg/dL 7 9* 9 4   MAGNESIUM mg/dL 2 2 2 0     Results from last 7 days   Lab Units 20  1448   AST U/L 21   ALT U/L 45   ALK PHOS U/L 78   TOTAL PROTEIN g/dL 7 8   ALBUMIN g/dL 4 4   TOTAL BILIRUBIN mg/dL 1 30*     Results from last 7 days   Lab Units 20  0450 20  1448   GLUCOSE RANDOM mg/dL 112 150*     Results from last 7 days   Lab Units 20  0450 20  1448   TROPONIN I ng/mL <0 02 <0 02 <0 02     Results from last 7 days   Lab Units 20  1448   PROTIME seconds 13 3   INR  0 98   PTT seconds 25     Results from last 7 days   Lab Units 20  1448   TSH 3RD GENERATON uIU/mL 2 998     Results from last 7 days   Lab Units 20  1448   NT-PRO BNP pg/mL 3,724*     Results from last 7 days   Lab Units 20  2014   CLARITY UA  Clear   COLOR UA  Yellow   SPEC GRAV UA  1 025   PH UA  6 0   GLUCOSE UA mg/dl Negative   KETONES UA mg/dl 40 (2+)*   BLOOD UA  Trace-Intact*   PROTEIN UA mg/dl Negative   NITRITE UA  Negative   BILIRUBIN UA  Negative   UROBILINOGEN UA E U /dl 0 2   LEUKOCYTES UA  Negative   WBC UA /hpf 2-4*   RBC UA /hpf 4-10*   BACTERIA UA /hpf Innumerable*   EPITHELIAL CELLS WET PREP /hpf Occasional   MUCUS THREADS  Moderate*     Vital Signs:   Temp (24hrs), Av 1 °F (36 7 °C), Min:97 9 °F (36 6 °C), Max:98 2 °F (36 8 °C)   Temp:  [97 9 °F (36 6 °C)-98 2 °F (36 8 °C)] 97 9 °F (36 6 °C)  HR:  [] 86  Resp:  [13-20] 20  BP: (107-147)/(58-86) 121/86  SpO2:  [92 %-98 %] 98 %  Body mass index is 33 37 kg/m²       Input and Output Summary (last 24 hours) 2020 1649  Gross per 24 hour   Intake 741 75 ml   Output    Net 741 75 ml     Diet Cardiovascular; Cardiac    Scheduled Medications:  apixaban 5 mg Oral BID   atorvastatin 80 mg Oral Daily   diltiazem 30 mg Oral Q6H [address]   metoprolol tartrate 37 5 mg Oral Q12H [address]     Continuous IV Infusions:  IVF diltiazem (CARDIZEM) 125 mg in sodium chloride 0 9 % 125 mL infusion           Ordered Dose: 5 mg/hr Route: Intravenous Frequency: Continuous @ 5 mL/hr Scheduled Start Date/Time: 06/26/20 1930  D/C 6/27/20 1121          PRN Meds:  acetaminophen 650 mg Oral Q6H PRN   ondansetron 4 mg Intravenous Q6H PRN     Discharge Plan: To be determined   Inpatient Case Management following for all discharge needs    Network Utilization Review Department  [email or web address]  ATTENTION: Please call with any questions or concerns to [phone] and carefully listen to the prompts so that you are directed to the right person  All voicemails are confidential   Joan Rodriguez all requests for admission clinical reviews, approved or denied determinations and any other requests to dedicated fax number below belonging to the campus where the patient is receiving treatment   List of dedicated fax numbers for the Facilities:  [address] DENIALS (Administrative/Medical Necessity) [phone]   [address] (Maternity/NICU/Pediatrics) [phone]   Minoo Abdul [phone]   Sherley Rao [phone]   Ken Gallardo [phone]   Gregorio Melgoza [phone]   1205 [facility] [address] [place] [address] [phone]   [facility]  [phone]   2201 [facility]  2401 [facility] 1000 [facility] [phone]

## 2020-06-27 NOTE — UTILIZATION REVIEW
Initial Clinical Review    Admission: Date/Time/Statement: Admission Orders (From admission, onward)     Ordered        06/26/20 1737  Inpatient Admission (expected length of stay for this patient Order details is greater than two midnights)  Once                Orders Placed This Encounter   Procedures    Inpatient Admission (expected length of stay for this patient Order details is greater than two midnights)     Standing Status:   Standing     Number of Occurrences:   1     Order Specific Question:   Admitting Physician     Answer:   Fabi Adames     Order Specific Question:   Level of Care     Answer:   Med Surg [16]     Order Specific Question:   Estimated length of stay     Answer:   More than 2 Midnights     Order Specific Question:   Certification     Answer:   I certify that inpatient services are medically necessary for this patient for a duration of greater than two midnights  See H&P and MD Progress Notes for additional information about the patient's course of treatment  ED Arrival Information     Expected Arrival Acuity Means of Arrival Escorted By Service Admission Type    - 6/26/2020 14:32 Emergent Ambulance 22 [facility]    Arrival Complaint    weakness     Chief Complaint   Patient presents with    Rapid Heart Rate     Pt arrives with BLS from care now, denies any CP hx of HTN  Assessment/Plan: 48year old male with PMHx HTN, HLD  Presented to [facility] ED from [facility] via EMS 2nd  Atrial Fib with RVR   1 day history of lightheadedness and dizziness with movement "not feeling well with generalized weakness and nausea"  Reports going to work today as a  with dizziness much worse especially with sudden movements  He went home in the afternoon and asked his wife to check his blood pressure which was low  In ED - EKG showed atrial fibrillation with rapid ventricular rate 150/min    CT brain negative  LABS:  WBC 14,310  BNP 3,724    ED Tx; IVF NSS x 2 L, IV Cadizem bolus + gtt, Lovenox, IV zofran   Admitted inpatient 6/26/20 at 1737 2nd Atrial fibrillation with rapid ventricular response - IV Cardizem gtt, ECHO, Cardiology consulted    ED Triage Vitals   Temperature Pulse Respirations Blood Pressure SpO2   06/26/20 1441 06/26/20 1440 06/26/20 1440 06/26/20 1440 06/26/20 1440   97 5 °F (36 4 °C) (!) 150 18 125/82 98 %      Temp Source Heart Rate Source Patient Position - Orthostatic VS BP Location FiO2 (%)   06/26/20 1441 06/26/20 1440 06/26/20 1440 06/26/20 1440 --   Tympanic Monitor Lying Left arm     Wt Readings from Last 1 Encounters:   06/26/20 102 kg (225 lb 15 5 oz)     Additional Vital Signs:   6/26/20 2322  98 °F (36 7 °C)  89  16  111/80    92 %  None (Room air)    06/26/20 21:14:58    75    107/81  90  96 %  None (Room air)    06/26/20 19:23:25  98 1 °F (36 7 °C)  77  17  117/78  91  94 %  None (Room air)    06/26/20 1918    95              06/26/20 18:35:30  98 2 °F (36 8 °C)  78  20  117/78  91  94 %      06/26/20 1745    110Abnormal   20  116/58  80  97 %      06/26/20 1730    104  13  118/62  83  95 %      06/26/20 1715    101  15  123/68  85  95 %      06/26/20 1700    108Abnormal   13  130/69  87  97 %      06/26/20 1645    122Abnormal   20  122/88  100  97 %      06/26/20 1630    115Abnormal   19  124/80            Pertinent Labs/Diagnostic Test Results:     Results from last 7 days   Lab Units 06/26/20  1448   WBC Thousand/uL 14 31*   HEMOGLOBIN g/dL 16 2   HEMATOCRIT % 48 7   PLATELETS Thousands/uL 252   NEUTROS ABS Thousands/µL 10 98*       Results from last 7 days   Lab Units 06/26/20  1448   SODIUM mmol/L 142   POTASSIUM mmol/L 4 3   CHLORIDE mmol/L 105   CO2 mmol/L 24   ANION GAP mmol/L 13   BUN mg/dL 23   CREATININE mg/dL 1 66*   EGFR ml/min/1 73sq m 47   CALCIUM mg/dL 9 4   MAGNESIUM mg/dL 2 0     Results from last 7 days   Lab Units 06/26/20  1448   AST U/L 21   ALT U/L 45 ALK PHOS U/L 78   TOTAL PROTEIN g/dL 7 8   ALBUMIN g/dL 4 4   TOTAL BILIRUBIN mg/dL 1 30*     Results from last 7 days   Lab Units 06/27/20  0450 06/26/20  1448   GLUCOSE RANDOM mg/dL 112 150*     Results from last 7 days   Lab Units 06/26/20 2011 06/26/20  1448   TROPONIN I ng/mL <0 02 <0 02     Results from last 7 days   Lab Units 06/26/20  1448   PROTIME seconds 13 3   INR  0 98   PTT seconds 25     Results from last 7 days   Lab Units 06/26/20  1448   TSH 3RD GENERATON uIU/mL 2 998     Results from last 7 days   Lab Units 06/26/20  1448   NT-PRO BNP pg/mL 3,724*     Results from last 7 days   Lab Units 06/26/20 2014   CLARITY UA  Clear   COLOR UA  Yellow   SPEC GRAV UA  1 025   PH UA  6 0   GLUCOSE UA mg/dl Negative   KETONES UA mg/dl 40 (2+)*   BLOOD UA  Trace-Intact*   PROTEIN UA mg/dl Negative   NITRITE UA  Negative   BILIRUBIN UA  Negative   UROBILINOGEN UA E U /dl 0 2   LEUKOCYTES UA  Negative   WBC UA /hpf 2-4*   RBC UA /hpf 4-10*   BACTERIA UA /hpf Innumerable*   EPITHELIAL CELLS WET PREP /hpf Occasional   MUCUS THREADS  Moderate*       EKG -  Atrial fibrillation with rapid ventricular response  Abnormal QRS-T angle, consider primary T wave abnormality  Abnormal ECG  No previous ECGs available    CHEST X RAY -  No acute cardiopulmonary disease      ED Treatment:   Medication Administration from 06/26/2020 1432 to 06/26/2020 1826       Date/Time Order Dose Route Action     06/26/2020 1500 diltiazem (CARDIZEM) injection 15 mg 15 mg Intravenous Given     06/26/2020 1448 ondansetron (ZOFRAN) injection 4 mg 4 mg Intravenous Given     06/26/2020 1555 sodium chloride 0 9 % bolus 1,000 mL 0 mL Intravenous Stopped     06/26/2020 1448 sodium chloride 0 9 % bolus 1,000 mL 1,000 mL Intravenous New Bag     06/26/2020 1658 diltiazem (CARDIZEM) 125 mg in sodium chloride 0 9 % 125 mL infusion 12 5 mg/hr Intravenous Rate/Dose Change     06/26/2020 1638 diltiazem (CARDIZEM) 125 mg in sodium chloride 0 9 % 125 mL infusion 10 mg/hr Intravenous Rate/Dose Change     06/26/2020 1617 diltiazem (CARDIZEM) 125 mg in sodium chloride 0 9 % 125 mL infusion 7 5 mg/hr Intravenous Rate/Dose Change     06/26/2020 1547 diltiazem (CARDIZEM) 125 mg in sodium chloride 0 9 % 125 mL infusion 5 mg/hr Intravenous New Bag     06/26/2020 1618 enoxaparin (LOVENOX) subcutaneous injection 100 mg 100 mg Subcutaneous Given     Past Medical History:   Diagnosis Date    Hyperlipidemia     Hypertension      Admitting Diagnosis: Rapid heart rate [R00 0]  Atrial fibrillation with rapid ventricular response ([facility] [address] ) [I48 91]    Age/Sex: 48 y o  male    Admission Orders:  Telemetry  VS q4hrs  Activity as tolerated  Diet Cardiovascular; Cardiac    Scheduled Medications:  apixaban 5 mg Oral BID   atorvastatin 80 mg Oral Daily   metoprolol tartrate 37 5 mg Oral Q12H [address]     Continuous IV Infusions:  IVF diltiazem (CARDIZEM) 125 mg in sodium chloride 0 9 % 125 mL infusion     Ordered Dose: 5 mg/hr Route: Intravenous Frequency: Continuous @ 5 mL/hr   Scheduled Start Date/Time: 06/26/20 1930         PRN Meds:  acetaminophen 650 mg Oral Q6H PRN   ondansetron 4 mg Intravenous Q6H PRN     IP CONSULT TO CARDIOLOGY    Network Utilization Review Department  [email or web address]  ATTENTION: Please call with any questions or concerns to [phone] and carefully listen to the prompts so that you are directed to the right person  All voicemails are confidential   Alfie Boyd all requests for admission clinical reviews, approved or denied determinations and any other requests to dedicated fax number below belonging to the campus where the patient is receiving treatment   List of dedicated fax numbers for the Facilities:  [address] [place] [address] DENIALS (Administrative/Medical Necessity) [phone]   [address] (Maternity/NICU/Pediatrics) [facility] [address] [facility] [phone] Cassius Kim [phone]   [facility] [address] [place] [address] [phone]   [facility]  [phone] 6537 [facility]  [phone]   [address] [place] [address] [phone]

## 2020-06-27 NOTE — ASSESSMENT & PLAN NOTE
Likely secondary to atrial fibrillation with rapid ventricular rate  CT scan of the brain was negative  Dizziness likely related to atrial fibrillation with rapid ventricular rate  Symptoms have resolved  Continue to monitor on telemetry  2D echo showed no significant valvular abnormalities with normal EF

## 2020-06-27 NOTE — ASSESSMENT & PLAN NOTE
Patient present to the emergency room complaining of dizziness since yesterday morning and not feeling well which has gotten progressively worse today  Patient noted to be in atrial fibrillation with rapid ventricular rate in the ED  Patient received 15 milligram Cardizem bolus in the ED and was started on Cardizem drip  Patient was on Cardizem drip  Patient is being transitioned to go Cardizem 30 milligram p o  Q 6 hours and started metoprolol 37 5 milligram p o  B i d    Patient's initial troponin was negative  TSH was normal  Patient to be scheduled for overnight pulse oximetry rule out sleep apnea  Serial troponins were negative  Patient will be on anticoagulation with Lovenox and will be transitioned to Eliquis  2D echo showed EF of 50-55%, mild-to-moderate mitral regurgitation

## 2020-06-27 NOTE — CONSULTS
Consultation - Cardiology   [facility] 48 y o  male MRN: [id number]  Unit/Bed#: [address] 419-01 Encounter: [id number]    Assessment/Plan     Assessment:  1  New onset atrial fibrillation of unknown duration:  Most likely greater than 24 hours due to patient's symptomatology  2  Hypertension  3  Dyslipidemia       Plan:  Patient has been admitted to the hospitalist service  1  Continue Cardizem drip at 5 milligrams/hour  2  Continue weight based Lovenox 1 milligram/kilogram q 12 hours  Will need to be transition to p o  Anticoagulant prior to discharge  3  Discussed with patient risk of atrial fibrillation including development of clots, stroke and rate related cardiomyopathy  Patient agreeable for anticoagulation  4  2D echocardiogram to evaluate cardiac function, structure and wall motion  5  Will add low-dose beta-blocker Lopressor 25 mg q 12 hours with parameters  6  Continue monitor telemetry  7  Will obtain overnight pulse oximetry to screen for obstructive sleep apnea  8  If patient does not cardiovert on medications, plan for MICA guided electrocardioversion on Monday  History of Present Illness   Physician Requesting Consult: Tom Ugalde MD  Reason for Consult / Principal Problem:  New onset rapid atrial fibrillation    HPI: 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 but still noted dizziness with position changes  Yesterday symptoms seem to worsen and he noted persistent diaphoresis and sensation of being warm  He denied any chest pain, pressure or palpitations he denied any shortness of breath    He had his wife take his blood pressure yesterday and was noted to be less than 937 systolic  They went to the urgent care for evaluation  Upon entering the urgent care patient felt nauseous and did vomit x1  Staff there told him that he was very pale, they performed an EKG and noted the rapid atrial fibrillation  911 was called and he was transferred to the emergency room  Patient was given Cardizem IV push and started on a Cardizem drip  He states almost immediately after receiving the bolus of IV Cardizem he started to feel better and his diaphoresis     Patient continues in atrial fibrillation, but rates at this time are 70s to [de-identified]  Patient has a history of dyslipidemia and hypertension  He denies any family history of coronary artery disease or arrhythmias  His troponins were negative on admission  Inpatient consult to Cardiology  Consult performed by: ANANTH Flores  Consult ordered by: Livia Mireles MD          Review of Systems   Constitutional: Positive for diaphoresis  Negative for activity change, appetite change and unexpected weight change  HENT: Negative  Negative for congestion, ear pain, nosebleeds and tinnitus  Eyes: Negative  Negative for photophobia and visual disturbance  Respiratory: Negative for chest tightness, shortness of breath and wheezing  Cardiovascular: Negative for chest pain, palpitations and leg swelling  Gastrointestinal: Negative for abdominal distention, constipation, diarrhea and vomiting  Endocrine: Negative  Negative for polydipsia, polyphagia and polyuria  Genitourinary: Negative  Musculoskeletal: Negative  Skin: Negative  Allergic/Immunologic: Negative  Neurological: Positive for dizziness and light-headedness  Negative for syncope, speech difficulty and weakness  Hematological: Negative  Psychiatric/Behavioral: Negative          Historical Information   Past Medical History:   Diagnosis Date    Hyperlipidemia     Hypertension      Past Surgical History:   Procedure Laterality Date    HERNIA REPAIR      KNEE SURGERY      TONSILLECTOMY       Social History     Substance and Sexual Activity   Alcohol Use Never    Frequency: Never    Comment: soc     Social History     Substance and Sexual Activity   Drug Use Never     E-Cigarette/Vaping    E-Cigarette Use Never User      E-Cigarette/Vaping Substances     Social History     Tobacco Use   Smoking Status Former Smoker    Packs/day: 1 00    Years: 6 00    Pack years: 6 00    Types: Cigarettes    Last attempt to quit: 2000    Years since quittin 0   Smokeless Tobacco Never Used   Tobacco Comment    15 years ago     Family History:   Family History   Problem Relation Age of Onset    Heart disease Father        Meds/Allergies   all current active meds have been reviewed, current meds:   Current Facility-Administered Medications   Medication Dose Route Frequency    acetaminophen (TYLENOL) tablet 650 mg  650 mg Oral Q6H PRN    atorvastatin (LIPITOR) tablet 80 mg  80 mg Oral Daily    diltiazem (CARDIZEM) 125 mg in sodium chloride 0 9 % 125 mL infusion  5 mg/hr Intravenous Continuous    enoxaparin (LOVENOX) subcutaneous injection 100 mg  1 mg/kg Subcutaneous Q12H [facility]    metoprolol tartrate (LOPRESSOR) tablet 25 mg  25 mg Oral Q12H [facility]    ondansetron (ZOFRAN) injection 4 mg  4 mg Intravenous Q6H PRN    and PTA meds:   Prior to Admission Medications   Prescriptions Last Dose Informant Patient Reported?  Taking?   lisinopril (ZESTRIL) 10 mg tablet 2020 at Unknown time Spouse/Significant Other Yes Yes   Sig: Take 10 mg by mouth daily   multivitamin (THERAGRAN) TABS 2020 at Unknown time  Yes Yes   Sig: Take 1 tablet by mouth daily   rosuvastatin (CRESTOR) 40 MG tablet 2020 at Unknown time Spouse/Significant Other Yes Yes   Sig: Take 40 mg by mouth daily      Facility-Administered Medications: None     No Known Allergies    Objective   Vitals: Blood pressure 113/69, pulse 81, temperature 98 2 °F (36 8 °C), temperature source Oral, resp  rate 16, height 5' 9" (1 753 m), weight 102 kg (225 lb 15 5 oz), SpO2 94 %  Orthostatic Blood Pressures      Most Recent Value   Blood Pressure  113/69 filed at 06/27/2020 0515   Patient Position - Orthostatic VS  Lying filed at 06/26/2020 2322            Intake/Output Summary (Last 24 hours) at 6/27/2020 5096  Last data filed at 6/26/2020 1926  Gross per 24 hour   Intake 480 ml   Output    Net 480 ml       Invasive Devices     Peripheral Intravenous Line            Peripheral IV 06/26/20 Right Antecubital less than 1 day                Physical Exam   Constitutional: He is oriented to person, place, and time  He appears well-developed and well-nourished  No distress  HENT:   Head: Normocephalic  Right Ear: External ear normal    Left Ear: External ear normal    Mouth/Throat: Oropharynx is clear and moist    Eyes: Pupils are equal, round, and reactive to light  Conjunctivae are normal  Right eye exhibits no discharge  Left eye exhibits no discharge  No scleral icterus  Neck: Normal range of motion  Neck supple  No JVD present  No thyromegaly present  Cardiovascular: Normal rate, intact distal pulses and normal pulses  An irregular rhythm present  No extrasystoles are present  No murmur heard  Pulmonary/Chest: Effort normal and breath sounds normal  No respiratory distress  He has no wheezes  He has no rales  Abdominal: Soft  Bowel sounds are normal  He exhibits no distension and no mass  Musculoskeletal: He exhibits no edema  Neurological: He is alert and oriented to person, place, and time  Skin: Skin is warm and dry  Capillary refill takes less than 2 seconds  He is not diaphoretic  Psychiatric: He has a normal mood and affect  His behavior is normal  Judgment and thought content normal    Nursing note and vitals reviewed  Lab Results:   I have personally reviewed pertinent lab results      CBC with diff:   Results from last 7 days   Lab Units 06/27/20  0450   WBC Thousand/uL 9 91   RBC Million/uL 4 79   HEMOGLOBIN g/dL 14 3   HEMATOCRIT % 43 5   MCV fL 91   MCH pg 29 9   MCHC g/dL 32 9   RDW % 12 7   MPV fL 11 4   PLATELETS Thousands/uL 205     CMP:   Results from last 7 days   Lab Units 06/27/20  0450 06/26/20  1448   SODIUM mmol/L 140 142   POTASSIUM mmol/L 3 8 4 3   CHLORIDE mmol/L 106 105   CO2 mmol/L 24 24   BUN mg/dL 20 23   CREATININE mg/dL 1 08 1 66*   CALCIUM mg/dL 7 9* 9 4   AST U/L  --  21   ALT U/L  --  45   ALK PHOS U/L  --  78   EGFR ml/min/1 73sq m 78 47     Troponin:   0   Lab Value Date/Time    TROPONINI <0 02 06/27/2020 0450    TROPONINI <0 02 06/26/2020 2011    TROPONINI <0 02 06/26/2020 1448     BNP:   Results from last 7 days   Lab Units 06/27/20  0450   POTASSIUM mmol/L 3 8   CHLORIDE mmol/L 106   CO2 mmol/L 24   BUN mg/dL 20   CREATININE mg/dL 1 08   CALCIUM mg/dL 7 9*   EGFR ml/min/1 73sq m 78     Coags:   Results from last 7 days   Lab Units 06/26/20  1448   PTT seconds 25   INR  0 98     TSH:   Results from last 7 days   Lab Units 06/26/20  1448   TSH 3RD GENERATON uIU/mL 2 998     Magnesium:   Results from last 7 days   Lab Units 06/27/20  0450   MAGNESIUM mg/dL 2 2     Lipid Profile:    pending  Imaging: I have personally reviewed pertinent reports      EKG:  Admission 12 lead EKG demonstrates rapid atrial fibrillation with ventricular rate of 160 beats per minute  VTE Prophylaxis: Sequential compression device Zollie Fontana)     Code Status: Level 1 - Full Code  Advance Directive and Living Will:      Power of :    POLST:      Ragena Dandy, [facility]   Cardiology

## 2020-06-27 NOTE — PROGRESS NOTES
Progress Note Melba Maker 1965, 48 y o  male MRN: [id number]    Unit/Bed#: [address] [place] [address] Encounter: [id number]    Primary Care Provider: No primary care provider on file  Date and time admitted to hospital: 6/26/2020  2:38 PM        * Atrial fibrillation with rapid ventricular response [facility])  Assessment & Plan  Patient present to the emergency room complaining of dizziness since yesterday morning and not feeling well which has gotten progressively worse today  Patient noted to be in atrial fibrillation with rapid ventricular rate in the ED  Patient received 15 milligram Cardizem bolus in the ED and was started on Cardizem drip  Patient was on Cardizem drip  Patient is being transitioned to go Cardizem 30 milligram p o  Q 6 hours and started metoprolol 37 5 milligram p o  B i d  Patient's initial troponin was negative  TSH was normal  Patient to be scheduled for overnight pulse oximetry rule out sleep apnea  Serial troponins were negative  Patient will be on anticoagulation with Lovenox and will be transitioned to Eliquis  2D echo showed EF of 50-55%, mild-to-moderate mitral regurgitation    Dizziness  Assessment & Plan  Likely secondary to atrial fibrillation with rapid ventricular rate  CT scan of the brain was negative  Dizziness likely related to atrial fibrillation with rapid ventricular rate  Symptoms have resolved  Continue to monitor on telemetry  2D echo showed no significant valvular abnormalities with normal EF    Hypertension  Assessment & Plan  Patient is on lisinopril which will be held at the current time  Patient being transitioned to p o   Cardizem and metoprolol    Hyperlipidemia  Assessment & Plan  Continue statin-Crestor to be substituted with Lipitor    Elevated serum creatinine  Assessment & Plan  Unknown baseline  Creatinine has improved to normal value  Continue to hold lisinopril for now      Leukocytosisresolved as of 6/27/2020  Assessment & Plan  Likely stress response  No clinical evidence of any infection  Resolved        VTE Pharmacologic Prophylaxis:   Pharmacologic: Apixaban (Eliquis)  Mechanical VTE Prophylaxis in Place: Yes    Patient Centered Rounds: I have performed bedside rounds with nursing staff today  Discussions with Specialists or Other Care Team Provider: Dr Elba Beltre    Education and Discussions with Family / Patient: yes    Time Spent for Care: 45 minutes  More than 50% of total time spent on counseling and coordination of care as described above  Current Length of Stay: 1 day(s)    Current Patient Status: Inpatient   Certification Statement: The patient will continue to require additional inpatient hospital stay due to Atrial fibrillation with rapid ventricular rate    Discharge Plan:  Home    Code Status: Level 1 - Full Code      Subjective:   Patient is feeling well  Denies any chest pain, shortness of breath, headache or dizziness  Patient is anxious to go home  Objective:     Vitals:   Temp (24hrs), Av 1 °F (36 7 °C), Min:97 9 °F (36 6 °C), Max:98 2 °F (36 8 °C)    Temp:  [97 9 °F (36 6 °C)-98 2 °F (36 8 °C)] 97 9 °F (36 6 °C)  HR:  [] 86  Resp:  [13-20] 20  BP: (107-147)/(58-86) 121/86  SpO2:  [92 %-98 %] 98 %  Body mass index is 33 37 kg/m²  Input and Output Summary (last 24 hours): Intake/Output Summary (Last 24 hours) at 2020 1649  Last data filed at 2020 1122  Gross per 24 hour   Intake 741 75 ml   Output    Net 741 75 ml       Physical Exam:     Physical Exam   Constitutional: No distress  HENT:   Head: Normocephalic and atraumatic  Eyes: Pupils are equal, round, and reactive to light  Conjunctivae are normal    Neck: Normal range of motion  Neck supple  Cardiovascular: Normal rate and normal heart sounds  Irregularly irregular   Pulmonary/Chest: Effort normal  No respiratory distress  He has no wheezes  He has no rhonchi  He has no rales  He exhibits no tenderness  Abdominal: Soft   Bowel sounds are normal  He exhibits no distension  There is no tenderness  There is no rebound and no guarding  Musculoskeletal: He exhibits no edema  Neurological: He is alert  No cranial nerve deficit  Skin: Skin is warm and dry  No rash noted  Additional Data:     Labs:    Results from last 7 days   Lab Units 06/27/20  0450   WBC Thousand/uL 9 91   HEMOGLOBIN g/dL 14 3   HEMATOCRIT % 43 5   PLATELETS Thousands/uL 205   NEUTROS PCT % 60   LYMPHS PCT % 28   MONOS PCT % 8   EOS PCT % 2     Results from last 7 days   Lab Units 06/27/20  0450 06/26/20  1448   POTASSIUM mmol/L 3 8 4 3   CHLORIDE mmol/L 106 105   CO2 mmol/L 24 24   BUN mg/dL 20 23   CREATININE mg/dL 1 08 1 66*   CALCIUM mg/dL 7 9* 9 4   ALK PHOS U/L  --  78   ALT U/L  --  45   AST U/L  --  21     Results from last 7 days   Lab Units 06/26/20  1448   INR  0 98       * I Have Reviewed All Lab Data Listed Above  * Additional [facility]ertinent Lab Tests Reviewed: [facility]      Recent Cultures (last 7 days):           Last 24 Hours Medication List:     Current Facility-Administered Medications:  acetaminophen 650 mg Oral Q6H PRN Lauren Espinoza MD   apixaban 5 mg Oral BID ANANTH [place]o   atorvastatin 80 mg Oral Daily Lauren Espinoza MD   diltiazem 30 mg Oral Q6H [facility] ANANTH [place]   metoprolol tartrate 37 5 mg Oral Q12H Boris Ramirez MD   ondansetron 4 mg Intravenous Q6H PRN Lauren Espinoza MD        Today, Patient Was Seen By: Lauren Espinoza MD    ** Please Note: Dictation voice to text software may have been used in the creation of this document   **

## 2020-06-28 PROBLEM — R79.89 ELEVATED SERUM CREATININE: Status: RESOLVED | Noted: 2020-06-26 | Resolved: 2020-06-28

## 2020-06-28 PROCEDURE — 94762 N-INVAS EAR/PLS OXIMTRY CONT: CPT

## 2020-06-28 PROCEDURE — 99232 SBSQ HOSP IP/OBS MODERATE 35: CPT | Performed by: INTERNAL MEDICINE

## 2020-06-28 PROCEDURE — 94760 N-INVAS EAR/PLS OXIMETRY 1: CPT

## 2020-06-28 RX ORDER — METOPROLOL TARTRATE 50 MG/1
50 TABLET, FILM COATED ORAL EVERY 12 HOURS SCHEDULED
Status: DISCONTINUED | OUTPATIENT
Start: 2020-06-28 | End: 2020-06-29 | Stop reason: HOSPADM

## 2020-06-28 RX ADMIN — DILTIAZEM HYDROCHLORIDE 30 MG: 30 TABLET, FILM COATED ORAL at 06:04

## 2020-06-28 RX ADMIN — METOPROLOL TARTRATE 50 MG: 50 TABLET, FILM COATED ORAL at 20:51

## 2020-06-28 RX ADMIN — APIXABAN 5 MG: 5 TABLET, FILM COATED ORAL at 18:10

## 2020-06-28 RX ADMIN — METOPROLOL TARTRATE 50 MG: 50 TABLET, FILM COATED ORAL at 08:56

## 2020-06-28 RX ADMIN — ATORVASTATIN CALCIUM 80 MG: 80 TABLET, FILM COATED ORAL at 08:56

## 2020-06-28 RX ADMIN — APIXABAN 5 MG: 5 TABLET, FILM COATED ORAL at 08:56

## 2020-06-28 NOTE — ASSESSMENT & PLAN NOTE
Patient present to the emergency room complaining of dizziness since yesterday morning and not feeling well which has gotten progressively worse today  Patient noted to be in atrial fibrillation with rapid ventricular rate in the ED  Patient received 15 milligram Cardizem bolus in the ED and was started on Cardizem drip  Patient was initially on Cardizem drip  Patient was later started on Cardizem 30 milligram p o  Q 6 hours and metoprolol 37 5 milligram p o  B i d  Patient is currently controlled on metoprolol 50 milligram p o  B i d    Patient's initial troponin was negative  TSH was normal  Patient to be scheduled for overnight pulse oximetry rule out sleep apnea  Serial troponins were negative  patient was initially on Lovenox and was transitioned to Eliquis  2D echo showed EF of 50-55%, mild-to-moderate mitral regurgitation  Patient is scheduled for MICA with cardioversion tomorrow  Patient had overnight pulse oximetry which showed AHI index of 4 and patient's went 7 minutes with O2 saturation less than 90%

## 2020-06-28 NOTE — PLAN OF CARE
Problem: Potential for Falls  Goal: Patient will remain free of falls  Description  INTERVENTIONS:  - Assess patient frequently for physical needs  -  Identify cognitive and physical deficits and behaviors that affect risk of falls    -  [place] fall precautions as indicated by assessment   - Educate patient/family on patient safety including physical limitations  - Instruct patient to call for assistance with activity based on assessment  - Modify environment to reduce risk of injury  - Consider OT/PT consult to assist with strengthening/mobility  Outcome: Progressing     Problem: PAIN - ADULT  Goal: Verbalizes/displays adequate comfort level or baseline comfort level  Description  Interventions:  - Encourage patient to monitor pain and request assistance  - Assess pain using appropriate pain scale  - Administer analgesics based on type and severity of pain and evaluate response  - Implement non-pharmacological measures as appropriate and evaluate response  - Consider cultural and social influences on pain and pain management  - Notify physician/advanced practitioner if interventions unsuccessful or patient reports new pain  Outcome: Progressing     Problem: INFECTION - ADULT  Goal: Absence or prevention of progression during hospitalization  Description  INTERVENTIONS:  - Assess and monitor for signs and symptoms of infection  - Monitor lab/diagnostic results  - Monitor all insertion sites, i e  indwelling lines, tubes, and drains  - Monitor endotracheal if appropriate and nasal secretions for changes in amount and color  - [place] appropriate cooling/warming therapies per order  - Administer medications as ordered  - Instruct and encourage patient and family to use good hand hygiene technique  - Identify and instruct in appropriate isolation precautions for identified infection/condition  Outcome: Progressing  Goal: Absence of fever/infection during neutropenic period  Description  INTERVENTIONS:  - Monitor WBC    Outcome: Progressing     Problem: SAFETY ADULT  Goal: Maintain or return to baseline ADL function  Description  INTERVENTIONS:  -  Assess patient's ability to carry out ADLs; assess patient's baseline for ADL function and identify physical deficits which impact ability to perform ADLs (bathing, care of mouth/teeth, toileting, grooming, dressing, etc )  - Assess/evaluate cause of self-care deficits   - Assess range of motion  - Assess patient's mobility; develop plan if impaired  - Assess patient's need for assistive devices and provide as appropriate  - Encourage maximum independence but intervene and supervise when necessary  - Involve family in performance of ADLs  - Assess for home care needs following discharge   - Consider OT consult to assist with ADL evaluation and planning for discharge  - Provide patient education as appropriate  Outcome: Progressing  Goal: Maintain or return mobility status to optimal level  Description  INTERVENTIONS:  - Assess patient's baseline mobility status (ambulation, transfers, stairs, etc )    - Identify cognitive and physical deficits and behaviors that affect mobility  - Identify mobility aids required to assist with transfers and/or ambulation (gait belt, sit-to-stand, lift, walker, cane, etc )  - [place] fall precautions as indicated by assessment  - Record patient progress and toleration of activity level on Mobility SBAR; progress patient to next Phase/Stage  - Instruct patient to call for assistance with activity based on assessment  - Consider rehabilitation consult to assist with strengthening/weightbearing, etc   Outcome: Progressing     Problem: DISCHARGE PLANNING  Goal: Discharge to home or other facility with appropriate resources  Description  INTERVENTIONS:  - Identify barriers to discharge w/patient and caregiver  - Arrange for needed discharge resources and transportation as appropriate  - Identify discharge learning needs (meds, wound care, etc )  - Arrange for interpretive services to assist at discharge as needed  - Refer to Case Management Department for coordinating discharge planning if the patient needs post-hospital services based on physician/advanced practitioner order or complex needs related to functional status, cognitive ability, or social support system  Outcome: Progressing     Problem: Knowledge Deficit  Goal: Patient/family/caregiver demonstrates understanding of disease process, treatment plan, medications, and discharge instructions  Description  Complete learning assessment and assess knowledge base    Interventions:  - Provide teaching at level of understanding  - Provide teaching via preferred learning methods  Outcome: Progressing     Problem: CARDIOVASCULAR - ADULT  Goal: Maintains optimal cardiac output and hemodynamic stability  Description  INTERVENTIONS:  - Monitor I/O, vital signs and rhythm  - Monitor for S/S and trends of decreased cardiac output  - Administer and titrate ordered vasoactive medications to optimize hemodynamic stability  - Assess quality of pulses, skin color and temperature  - Assess for signs of decreased coronary artery perfusion  - Instruct patient to report change in severity of symptoms  Outcome: Progressing  Goal: Absence of cardiac dysrhythmias or at baseline rhythm  Description  INTERVENTIONS:  - Continuous cardiac monitoring, vital signs, obtain 12 lead EKG if ordered  - Administer antiarrhythmic and heart rate control medications as ordered  - Monitor electrolytes and administer replacement therapy as ordered  Outcome: Progressing

## 2020-06-28 NOTE — ASSESSMENT & PLAN NOTE
Likely secondary to atrial fibrillation with rapid ventricular rate  CT scan of the brain was negative  Dizziness likely related to atrial fibrillation with rapid ventricular rate  Symptoms have resolved  Telemetry showed controlled atrial fib  2D echo showed no significant valvular abnormalities with normal EF

## 2020-06-28 NOTE — PROGRESS NOTES
Progress Note She Lanier 1965, 48 y o  male MRN: [id number]    Unit/Bed#: [address] [place] [address] Encounter: [id number]    Primary Care Provider: No primary care provider on file  Date and time admitted to hospital: 6/26/2020  2:38 PM        * Atrial fibrillation with rapid ventricular response [facility])  Assessment & Plan  Patient present to the emergency room complaining of dizziness since yesterday morning and not feeling well which has gotten progressively worse today  Patient noted to be in atrial fibrillation with rapid ventricular rate in the ED  Patient received 15 milligram Cardizem bolus in the ED and was started on Cardizem drip  Patient was initially on Cardizem drip  Patient was later started on Cardizem 30 milligram p o  Q 6 hours and metoprolol 37 5 milligram p o  B i d  Patient is currently controlled on metoprolol 50 milligram p o  B i d  Patient's initial troponin was negative  TSH was normal  Patient to be scheduled for overnight pulse oximetry rule out sleep apnea  Serial troponins were negative  patient was initially on Lovenox and was transitioned to Eliquis  2D echo showed EF of 50-55%, mild-to-moderate mitral regurgitation  Patient is scheduled for MICA with cardioversion tomorrow  Patient had overnight pulse oximetry which showed AHI index of 4 and patient's went 7 minutes with O2 saturation less than 90%    Dizziness  Assessment & Plan  Likely secondary to atrial fibrillation with rapid ventricular rate  CT scan of the brain was negative  Dizziness likely related to atrial fibrillation with rapid ventricular rate  Symptoms have resolved  Telemetry showed controlled atrial fib  2D echo showed no significant valvular abnormalities with normal EF    Hypertension  Assessment & Plan  Patient was on lisinopril which was held as patient has been on Cardizem drip  Patient currently is on metoprolol 50 milligram p o  B i d      Hyperlipidemia  Assessment & Plan  Continue statin-Crestor to be substituted with Lipitor    Elevated serum creatinineresolved as of 2020  Assessment & Plan  Unknown baseline  Creatinine has improved to normal value  Continue to hold lisinopril for now          VTE Pharmacologic Prophylaxis:   Pharmacologic: Apixaban (Eliquis)  Mechanical VTE Prophylaxis in Place: Yes    Patient Centered Rounds: I have performed bedside rounds with nursing staff today  Discussions with Specialists or Other Care Team Provider: Dr Terri Huerta    Education and Discussions with Family / Patient: yes    Time Spent for Care: 45 minutes  More than 50% of total time spent on counseling and coordination of care as described above  Current Length of Stay: 2 day(s)    Current Patient Status: Inpatient   Certification Statement: The patient will continue to require additional inpatient hospital stay due to Atrial fibrillation    Discharge Plan:  Home    Code Status: Level 1 - Full Code      Subjective:   Patient is feeling well  Denies any chest pain, headache, dizziness, shortness of breath or palpitation    Objective:     Vitals:   Temp (24hrs), Av 6 °F (37 °C), Min:98 °F (36 7 °C), Max:99 4 °F (37 4 °C)    Temp:  [98 °F (36 7 °C)-99 4 °F (37 4 °C)] 98 4 °F (36 9 °C)  HR:  [62-96] 80  Resp:  [17-21] 17  BP: (127-134)/(86-93) 134/86  SpO2:  [94 %-97 %] 95 %  Body mass index is 33 37 kg/m²  Input and Output Summary (last 24 hours): Intake/Output Summary (Last 24 hours) at 2020 1545  Last data filed at 2020 0901  Gross per 24 hour   Intake 830 ml   Output    Net 830 ml       Physical Exam:     Physical Exam   Constitutional: No distress  HENT:   Head: Normocephalic and atraumatic  Eyes: Pupils are equal, round, and reactive to light  Conjunctivae are normal    Neck: Normal range of motion  Neck supple  Cardiovascular: Normal rate and normal heart sounds  Irregularly irregular   Pulmonary/Chest: Effort normal  No respiratory distress  He has no wheezes   He has no rhonchi  He has no rales  He exhibits no tenderness  Abdominal: Soft  Bowel sounds are normal  He exhibits no distension  There is no tenderness  There is no rebound and no guarding  Musculoskeletal: He exhibits no edema  Neurological: He is alert  No cranial nerve deficit  Skin: Skin is warm and dry  No rash noted  Additional Data:     Labs:    Results from last 7 days   Lab Units 06/27/20  0450   WBC Thousand/uL 9 91   HEMOGLOBIN g/dL 14 3   HEMATOCRIT % 43 5   PLATELETS Thousands/uL 205   NEUTROS PCT % 60   LYMPHS PCT % 28   MONOS PCT % 8   EOS PCT % 2     Results from last 7 days   Lab Units 06/27/20  0450 06/26/20  1448   POTASSIUM mmol/L 3 8 4 3   CHLORIDE mmol/L 106 105   CO2 mmol/L 24 24   BUN mg/dL 20 23   CREATININE mg/dL 1 08 1 66*   CALCIUM mg/dL 7 9* 9 4   ALK PHOS U/L  --  78   ALT U/L  --  45   AST U/L  --  21     Results from last 7 days   Lab Units 06/26/20  1448   INR  0 98       * I Have Reviewed All Lab Data Listed Above  * Additional Pertinent Lab Tests Reviewed: All [facility] Admission Reviewed      Recent Cultures (last 7 days):           Last 24 Hours Medication List:     Current Facility-Administered Medications:  acetaminophen 650 mg Oral Q6H PRN Tom Ugalde MD   apixaban 5 mg Oral BID ANANTH Plata   atorvastatin 80 mg Oral Daily Tom Ugalde MD   metoprolol tartrate 50 mg Oral Q12H [address] BekahANANTH Cain   ondansetron 4 mg Intravenous Q6H PRN Tom Ugalde MD        Today, Patient Was Seen By: Tom Ugalde MD    ** Please Note: Dictation voice to text software may have been used in the creation of this document   **

## 2020-06-28 NOTE — UTILIZATION REVIEW
Continued Stay Review     Date: 6/28/20 Sunday                    Current Patient Class: Inpatient   Current Level of Care: Acute Med Surg with Telemetry     HPI:   48year old male with PMHx HTN, HLD  Initially admitted on  6/26/20 2nd Atrial fibrillation with rapid ventricular response on  IV Cardizem gtt - transitioned to po Cardizem and started on metoprolol po  Bid  Serial Troponin negative x 3      Assessment/Plan:   *currently atrial fibrillation with heart rate around  beats per minute on po metoprolol bid  2D echo showed EF of 50-55%, mild-to-moderate mitral regurgitation  Patient is scheduled for MICA with cardioversion tomorrow  had overnight pulse oximetry which showed AHI index of 4 and patient's went 7 minutes with O2 saturation less than 90%    6/28/20 Cardiology Note:  New onset atrial fibrillation of unknown duration - most likely greater than 24 hours due to patient's symptomatology  Rate controlled with beta-blocker  CHADS2 Vasc = 1, started on Eliquis 5 mg b i d  Overnight pulse oximetry with AHI of 4 and no desaturations  *NPO after midnight for MICA guided cardioversion in the a m        Pertinent Labs/Diagnostic Results:     Results from last 7 days   Lab Units 06/27/20  0450 06/26/20  1448   WBC Thousand/uL 9 91 14 31*   HEMOGLOBIN g/dL 14 3 16 2   HEMATOCRIT % 43 5 48 7   PLATELETS Thousands/uL 205 252   NEUTROS ABS Thousands/µL 6 09 10 98*     Results from last 7 days   Lab Units 06/27/20  0450 06/26/20  1448   SODIUM mmol/L 140 142   POTASSIUM mmol/L 3 8 4 3   CHLORIDE mmol/L 106 105   CO2 mmol/L 24 24   ANION GAP mmol/L 10 13   BUN mg/dL 20 23   CREATININE mg/dL 1 08 1 66*   EGFR ml/min/1 73sq m 78 47   CALCIUM mg/dL 7 9* 9 4   MAGNESIUM mg/dL 2 2 2 0     Results from last 7 days   Lab Units 06/26/20  1448   AST U/L 21   ALT U/L 45   ALK PHOS U/L 78   TOTAL PROTEIN g/dL 7 8   ALBUMIN g/dL 4 4   TOTAL BILIRUBIN mg/dL 1 30*      Results from last 7 days   Lab Units 06/27/20  0450 06/26/20  1448   GLUCOSE RANDOM mg/dL 112 150*     Results from last 7 days   Lab Units 06/27/20  0450 06/26/20 2011 06/26/20  1448   TROPONIN I ng/mL <0 02 <0 02 <0 02     Results from last 7 days   Lab Units 06/26/20  1448   PROTIME seconds 13 3   INR  0 98   PTT seconds 25     Results from last 7 days   Lab Units 06/26/20  1448   TSH 3RD GENERATON uIU/mL 2 998     Results from last 7 days   Lab Units 06/26/20  1448   NT-PRO BNP pg/mL 3,724*     Vital Signs:   Blood pressure 132/88, pulse 81, temperature 98 8 °F (37 1 °C), temperature source Oral, resp  rate 18, height 5' 9" (1 753 m), weight 102 kg (225 lb 15 5 oz), SpO2 95 %  Orthostatic Blood Pressures      Most Recent Value   Blood Pressure  132/88 filed at 06/28/2020 0846   Patient Position - Orthostatic VS  Sitting filed at 06/28/2020 0846      I/O        06/26 0701 - 06/27 0700 06/27 0701 - 06/28 0700 06/28 0701 -      P  O  480 880 180     I V  (mL/kg)   31 8 (0 3)       Total Intake(mL/kg) 480 (4 7) 911 8 (8 9) 180 (1 8)     Net +480 +911 8 +180                 Unmeasured Urine Occurrence   6 x       Unmeasured Stool Occurrence   2 x        06/26/20 1440 06/26/20 1835   Weight: 103 kg (226 lb 3 1 oz) 102 kg (225 lb 15 5 oz)      Body mass index is 33 37 kg/m²  Diet Cardiovascular; Cardiac (NPO after midnite for MICA guided Cardioversion 6/29/20)     Scheduled Medications:  apixaban 5 mg Oral BID   atorvastatin 80 mg Oral Daily   metoprolol tartrate 50 mg Oral Q12H [address]     PRN Meds:  acetaminophen 650 mg Oral Q6H PRN   ondansetron 4 mg Intravenous Q6H PRN     Discharge Plan: To be determined   Inpatient Case Management following for all discharge needs    Network Utilization Review Department  [email or web address]  ATTENTION: Please call with any questions or concerns to [phone] and carefully listen to the prompts so that you are directed to the right person   All voicemails are confidential   Jael Lopez all requests for admission clinical reviews, approved or denied determinations and any other requests to dedicated fax number below belonging to the campus where the patient is receiving treatment   List of dedicated fax numbers for the Facilities:  [address] [place] [address] DENIALS (Administrative/Medical Necessity) [phone]   [address] (Maternity/NICU/Pediatrics) [phone]   Vee Paiz [phone]   [facility] [phone]   Ren Dai [phone]   [facility] [phone]   [facility] [address] [phone]   [facility]  [phone]   [address] [facility] [address] [facility]  [address] [facility] [address] [facility] [phone]

## 2020-06-28 NOTE — PROGRESS NOTES
Progress Note - Cardiology   [facility]     [facility] 48 y o  male MRN: [id number]  : 1965  Unit/Bed#: 4 [facility] Encounter: [id number]    Assessment and Plan:   1  New onset atrial fibrillation of unknown duration:  Most likely greater than 24 hours due to patient's symptomatology  Rate controlled with beta-blocker  CHADS2 Vasc = 1, started on Eliquis 5 mg b i d  Overnight pulse oximetry with AHI of 4 and no desaturations  Patient NPO after midnight for MICA guided cardioversion in the a   2  Hypertension:  Blood pressure is stable with beta-blocker    3  Dyslipidemia     Subjective / Objective:   Patient seen and examined  He is feeling well, but unfortunately he is still in atrial fibrillation with a controlled ventricular response  Patient was started on beta-blocker and Eliquis yesterday  Patient is agreeable to undergo MICA guided cardioversion in the a m   Orders placed  Vitals: Blood pressure 132/88, pulse 81, temperature 98 8 °F (37 1 °C), temperature source Oral, resp  rate 18, height 5' 9" (1 753 m), weight 102 kg (225 lb 15 5 oz), SpO2 95 %  Vitals:    20 1440 20 1835   Weight: 103 kg (226 lb 3 1 oz) 102 kg (225 lb 15 5 oz)     Body mass index is 33 37 kg/m²  BP Readings from Last 3 Encounters:   20 132/88     Orthostatic Blood Pressures      Most Recent Value   Blood Pressure  132/88 filed at 2020 0846   Patient Position - Orthostatic VS  Sitting filed at 2020 0846        I/O        07 -  0700  07 -  0700  07 -  0700    P  O  480 880 180    I V  (mL/kg)  31 8 (0 3)     Total Intake(mL/kg) 480 (4 7) 911 8 (8 9) 180 (1 8)    Net +480 +911 8 +180           Unmeasured Urine Occurrence  6 x     Unmeasured Stool Occurrence  2 x         Invasive Devices     Peripheral Intravenous Line            Peripheral IV 20 Right Antecubital 1 day                  Intake/Output Summary (Last  hours) at 6/28/2020 1001  Last data filed at 6/28/2020 0901  Gross per 24 hour   Intake 851 75 ml   Output    Net 851 75 ml         Physical Exam:   Physical Exam   Constitutional: He is oriented to person, place, and time  He appears well-developed and well-nourished  No distress  HENT:   Head: Normocephalic  Right Ear: External ear normal    Left Ear: External ear normal    Eyes: Pupils are equal, round, and reactive to light  Conjunctivae are normal  Right eye exhibits no discharge  Left eye exhibits no discharge  No scleral icterus  Neck: Normal range of motion  Neck supple  No JVD present  No thyromegaly present  Cardiovascular: Normal rate, intact distal pulses and normal pulses  An irregular rhythm present  Occasional extrasystoles are present  Pulmonary/Chest: Effort normal and breath sounds normal  No respiratory distress  He has no wheezes  He has no rales  Abdominal: Soft  Bowel sounds are normal  He exhibits no distension  Musculoskeletal: He exhibits no edema  Neurological: He is alert and oriented to person, place, and time  Skin: Skin is warm and dry  Capillary refill takes less than 2 seconds  He is not diaphoretic  Psychiatric: He has a normal mood and affect  His behavior is normal  Judgment and thought content normal    Nursing note and vitals reviewed                Medications/ Allergies:     Current Facility-Administered Medications:  acetaminophen 650 mg Oral Q6H PRN Michael Drew MD   apixaban 5 mg Oral BID Ragena Dandy, CRNP   atorvastatin 80 mg Oral Daily Michael Drew MD   metoprolol tartrate 50 mg Oral Q12H [facility] Ragena Dandy, CRNP   ondansetron 4 mg Intravenous Q6H PRN Michael Drew MD       acetaminophen 650 mg Q6H PRN   ondansetron 4 mg Q6H PRN     No Known Allergies    VTE Pharmacologic Prophylaxis:   Sequential compression device (Venodyne)  and Eliquis    Labs:   Troponins:  Results from last 7 days   Lab Units 06/27/20  0450 06/26/20 2011 06/26/20  1448   TROPONIN I ng/mL <0 02 <0 02 <0 02     CBC with diff:  Results from last 7 days   Lab Units 06/27/20  0450 06/26/20  1448   WBC Thousand/uL 9 91 14 31*   HEMOGLOBIN g/dL 14 3 16 2   HEMATOCRIT % 43 5 48 7   MCV fL 91 88   PLATELETS Thousands/uL 205 252   MCH pg 29 9 29 4   MCHC g/dL 32 9 33 3   RDW % 12 7 12 4   MPV fL 11 4 11 4   NRBC AUTO /100 WBCs 0 0     CMP:  Results from last 7 days   Lab Units 06/27/20  0450 06/26/20  1448   SODIUM mmol/L 140 142   POTASSIUM mmol/L 3 8 4 3   CHLORIDE mmol/L 106 105   CO2 mmol/L 24 24   ANION GAP mmol/L 10 13   BUN mg/dL 20 23   CREATININE mg/dL 1 08 1 66*   CALCIUM mg/dL 7 9* 9 4   AST U/L  --  21   ALT U/L  --  45   ALK PHOS U/L  --  78   TOTAL PROTEIN g/dL  --  7 8   ALBUMIN g/dL  --  4 4   TOTAL BILIRUBIN mg/dL  --  1 30*   EGFR ml/min/1 73sq m 78 47     Magnesium:  Results from last 7 days   Lab Units 06/27/20  0450 06/26/20  1448   MAGNESIUM mg/dL 2 2 2 0     Coags:  Results from last 7 days   Lab Units 06/26/20  1448   PTT seconds 25   INR  0 98     TSH:  Results from last 7 days   Lab Units 06/26/20  1448   TSH 3RD GENERATON uIU/mL 2 998     Lipid Profile:  Results from last 7 days   Lab Units 06/27/20  0450   CHOLESTEROL mg/dL 102   TRIGLYCERIDES mg/dL 91   HDL mg/dL 34*   LDL CALC mg/dL 50     NT-proBNP:   Recent Labs     06/26/20  1448   NTBNP 3,724*        Imaging & Testing   I have personally reviewed pertinent reports  Xr Chest 1 View Portable    Result Date: 6/26/2020  Narrative: CHEST INDICATION:   atrial fib  COMPARISON:  None EXAM PERFORMED/VIEWS:  XR CHEST PORTABLE FINDINGS: Cardiomediastinal silhouette appears unremarkable  The lungs are clear  No pneumothorax or pleural effusion  Osseous structures appear within normal limits for patient age  Impression: No acute cardiopulmonary disease   Workstation performed: [id number]     Ct Head Without Contrast    Result Date: 6/26/2020  Narrative: CT BRAIN - WITHOUT CONTRAST INDICATION: Dizziness, non-specific  COMPARISON:  None  TECHNIQUE:  CT examination of the brain was performed  In addition to axial images, coronal 2D reformatted images were created and submitted for interpretation  Radiation dose length product (DLP) for this visit:  885 65 mGy-cm   This examination, like all CT scans performed in the [facility], was performed utilizing techniques to minimize radiation dose exposure, including the use of iterative  reconstruction and automated exposure control  IMAGE QUALITY:  Diagnostic  FINDINGS: PARENCHYMA:  No intracranial mass, mass effect or midline shift  No CT signs of acute infarction  No acute parenchymal hemorrhage  VENTRICLES AND EXTRA-AXIAL SPACES:  Normal for the patient's age  VISUALIZED ORBITS AND PARANASAL SINUSES:  Unremarkable  CALVARIUM AND EXTRACRANIAL SOFT TISSUES:  Normal      Impression: No acute intracranial abnormality  Workstation performed: [id number]        EKG / Monitor: Personally reviewed      Atrial flutter with controlled ventricular response    Cardiac testing:   Results for orders placed during the hospital encounter of 20   Echo complete with contrast if indicated    Diamond Aguayo   [address] [address]  [phone]    Transthoracic Echocardiogram  2D, M-mode, Doppler, and Color Doppler    Study date:  2020    Patient: Tuan De La Torre  MR number: [id number]  Account number: [de-identified]  : 1965  Age: 48 years  Gender: Male  Status: Inpatient  Location: Bedside  Height: 69 in  Weight: 224 6 lb  BP: 129/ 66 mmHg    Indications: Atrial Fibrillation    Diagnoses: 427 31 - ATRIAL FIBRILLATION    Sonographer:  CLAUDETTE Ventura  Referring Physician:  Kaykay Yap MD  Group:  [place] [id number] [facility]  Interpreting Physician:  Mak Vargas MD    SUMMARY    LEFT VENTRICLE:  Systolic function was at the lower limits of normal  Ejection fraction was estimated in the range of 50 % to 55 % to be 55 %  There were no regional wall motion abnormalities  Wall thickness was mildly increased  There was mild concentric hypertrophy  RIGHT VENTRICLE:  The size was at the upper limits of normal     LEFT ATRIUM:  The atrium was mildly to moderately dilated  RIGHT ATRIUM:  The atrium was mildly dilated  MITRAL VALVE:  There was mild to moderate regurgitation  TRICUSPID VALVE:  There was trace regurgitation  Estimated peak PA pressure was 25 mmHg  IVC, HEPATIC VEINS:  The respirophasic change in diameter was more than 50%  HISTORY: PRIOR HISTORY: HTN, A-Fib, HLD    PROCEDURE: The procedure was performed at the bedside  This was a routine study  The transthoracic approach was used  The study included complete 2D imaging, M-mode, complete spectral Doppler, and color Doppler  The heart rate was 92 bpm,  at the start of the study  Image quality was adequate  LEFT VENTRICLE: Size was normal  Systolic function was at the lower limits of normal  Ejection fraction was estimated in the range of 50 % to 55 % to be 55 %  There were no regional wall motion abnormalities  Wall thickness was mildly  increased  There was mild concentric hypertrophy  DOPPLER: The study was not technically sufficient to allow evaluation of LV diastolic function  RIGHT VENTRICLE: The size was at the upper limits of normal  Systolic function was normal  Wall thickness was normal     LEFT ATRIUM: The atrium was mildly to moderately dilated  RIGHT ATRIUM: The atrium was mildly dilated  MITRAL VALVE: There was normal leaflet separation  DOPPLER: The transmitral velocity was within the normal range  There was no evidence for stenosis  There was mild to moderate regurgitation  AORTIC VALVE: The valve was trileaflet  Leaflets exhibited mildly increased thickness and normal cuspal separation  DOPPLER: Transaortic velocity was within the normal range  There was no evidence for stenosis   There was no regurgitation  TRICUSPID VALVE: DOPPLER: There was trace regurgitation  Estimated peak PA pressure was 25 mmHg  PULMONIC VALVE: DOPPLER: There was no significant regurgitation  PERICARDIUM: There was no thickening or calcification  There was no pericardial effusion  AORTA: The root exhibited normal size  SYSTEMIC VEINS: IVC: The inferior vena cava was normal in size  The respirophasic change in diameter was more than 50%  SYSTEM MEASUREMENT TABLES    2D mode  AoR Diam 2D: 3 2 cm  LA Diam (2D): 4 5 cm  LA/Ao (2D): 1 41  FS (2D Teich): 24 2 %  IVSd (2D): 1 31 cm  LVDEV: 92 9 cmï¾³  LVESV: 48 1 cmï¾³  LVIDd(2D): 4 51 cm  LVISd (2D): 3 42 cm  LVPWd (2D): 1 24 cm  SV (Teich): 44 8 cmï¾³    Apical four chamber  LVEF A4C: 49 %    Unspecified Scan Mode  MV Peak E Jae  Mean: 933 mm/s  MVA (PHT): 4 15 cmï¾²  PHT: 53 ms  Max P mm[Hg]  V Max: 2240 mm/s  Vmax: 2250 mm/s  RA Area: 19 2 cmï¾²  RA Volume: 56 3 cmï¾³  TAPSE: 2 1 cm    [employer] Accredited Echocardiography Laboratory    Prepared and electronically signed by    Michelle Valdez MD  Signed 2020 10:26:58           Nica Estrada        "This note has been constructed using a voice recognition system  Therefore there may be syntax, spelling, and/or grammatical errors   Please call if you have any questions  "

## 2020-06-28 NOTE — ASSESSMENT & PLAN NOTE
Patient was on lisinopril which was held as patient has been on Cardizem drip  Patient currently is on metoprolol 50 milligram p o  B i d

## 2020-06-29 ENCOUNTER — ANESTHESIA (INPATIENT)
Dept: NON INVASIVE DIAGNOSTICS | Facility: HOSPITAL | Age: 55
DRG: 310 | End: 2020-06-29
Payer: COMMERCIAL

## 2020-06-29 VITALS
SYSTOLIC BLOOD PRESSURE: 121 MMHG | HEART RATE: 67 BPM | TEMPERATURE: 98.3 F | WEIGHT: 225.97 LBS | DIASTOLIC BLOOD PRESSURE: 80 MMHG | HEIGHT: 69 IN | BODY MASS INDEX: 33.47 KG/M2 | OXYGEN SATURATION: 95 % | RESPIRATION RATE: 18 BRPM

## 2020-06-29 LAB
ANION GAP SERPL CALCULATED.3IONS-SCNC: 11 MMOL/L (ref 4–13)
ATRIAL RATE: 0 BPM
ATRIAL RATE: 97 BPM
BUN SERPL-MCNC: 19 MG/DL (ref 5–25)
CALCIUM SERPL-MCNC: 8.4 MG/DL (ref 8.3–10.1)
CHLORIDE SERPL-SCNC: 104 MMOL/L (ref 100–108)
CO2 SERPL-SCNC: 23 MMOL/L (ref 21–32)
CREAT SERPL-MCNC: 1 MG/DL (ref 0.6–1.3)
GFR SERPL CREATININE-BSD FRML MDRD: 86 ML/MIN/1.73SQ M
GLUCOSE SERPL-MCNC: 97 MG/DL (ref 65–140)
POTASSIUM SERPL-SCNC: 4.3 MMOL/L (ref 3.5–5.3)
QRS AXIS: 0 DEGREES
QRS AXIS: 19 DEGREES
QRSD INTERVAL: 0 MS
QRSD INTERVAL: 84 MS
QT INTERVAL: 0 MS
QT INTERVAL: 328 MS
QTC INTERVAL: 0 MS
QTC INTERVAL: 423 MS
SARS-COV-2 RNA RESP QL NAA+PROBE: NEGATIVE
SODIUM SERPL-SCNC: 138 MMOL/L (ref 136–145)
T WAVE AXIS: 0 DEGREES
T WAVE AXIS: 26 DEGREES
VENTRICULAR RATE: 0 BPM
VENTRICULAR RATE: 100 BPM

## 2020-06-29 PROCEDURE — 99232 SBSQ HOSP IP/OBS MODERATE 35: CPT | Performed by: NURSE PRACTITIONER

## 2020-06-29 PROCEDURE — 92960 CARDIOVERSION ELECTRIC EXT: CPT | Performed by: INTERNAL MEDICINE

## 2020-06-29 PROCEDURE — 93010 ELECTROCARDIOGRAM REPORT: CPT | Performed by: INTERNAL MEDICINE

## 2020-06-29 PROCEDURE — 99239 HOSP IP/OBS DSCHRG MGMT >30: CPT | Performed by: INTERNAL MEDICINE

## 2020-06-29 PROCEDURE — 80048 BASIC METABOLIC PNL TOTAL CA: CPT | Performed by: INTERNAL MEDICINE

## 2020-06-29 PROCEDURE — 92960 CARDIOVERSION ELECTRIC EXT: CPT

## 2020-06-29 PROCEDURE — 87635 SARS-COV-2 COVID-19 AMP PRB: CPT | Performed by: NURSE PRACTITIONER

## 2020-06-29 PROCEDURE — 93005 ELECTROCARDIOGRAM TRACING: CPT

## 2020-06-29 PROCEDURE — 5A2204Z RESTORATION OF CARDIAC RHYTHM, SINGLE: ICD-10-PCS | Performed by: INTERNAL MEDICINE

## 2020-06-29 PROCEDURE — 93312 ECHO TRANSESOPHAGEAL: CPT

## 2020-06-29 RX ORDER — SODIUM CHLORIDE 9 MG/ML
INJECTION, SOLUTION INTRAVENOUS CONTINUOUS PRN
Status: DISCONTINUED | OUTPATIENT
Start: 2020-06-29 | End: 2020-06-29 | Stop reason: SURG

## 2020-06-29 RX ORDER — PROPOFOL 10 MG/ML
INJECTION, EMULSION INTRAVENOUS AS NEEDED
Status: DISCONTINUED | OUTPATIENT
Start: 2020-06-29 | End: 2020-06-29 | Stop reason: SURG

## 2020-06-29 RX ORDER — LIDOCAINE HYDROCHLORIDE 10 MG/ML
INJECTION, SOLUTION EPIDURAL; INFILTRATION; INTRACAUDAL; PERINEURAL AS NEEDED
Status: DISCONTINUED | OUTPATIENT
Start: 2020-06-29 | End: 2020-06-29 | Stop reason: SURG

## 2020-06-29 RX ORDER — PROPOFOL 10 MG/ML
INJECTION, EMULSION INTRAVENOUS CONTINUOUS PRN
Status: DISCONTINUED | OUTPATIENT
Start: 2020-06-29 | End: 2020-06-29

## 2020-06-29 RX ORDER — METOPROLOL TARTRATE 50 MG/1
50 TABLET, FILM COATED ORAL EVERY 12 HOURS SCHEDULED
Qty: 60 TABLET | Refills: 0 | Status: SHIPPED | OUTPATIENT
Start: 2020-06-29 | End: 2020-11-13

## 2020-06-29 RX ADMIN — PROPOFOL 30 MG: 10 INJECTION, EMULSION INTRAVENOUS at 10:49

## 2020-06-29 RX ADMIN — ATORVASTATIN CALCIUM 80 MG: 80 TABLET, FILM COATED ORAL at 08:31

## 2020-06-29 RX ADMIN — PROPOFOL 20 MG: 10 INJECTION, EMULSION INTRAVENOUS at 10:51

## 2020-06-29 RX ADMIN — SODIUM CHLORIDE: 9 INJECTION, SOLUTION INTRAVENOUS at 10:40

## 2020-06-29 RX ADMIN — PROPOFOL 120 MG: 10 INJECTION, EMULSION INTRAVENOUS at 10:45

## 2020-06-29 RX ADMIN — PROPOFOL 20 MG: 10 INJECTION, EMULSION INTRAVENOUS at 10:53

## 2020-06-29 RX ADMIN — LIDOCAINE HYDROCHLORIDE 50 MG: 10 INJECTION, SOLUTION EPIDURAL; INFILTRATION; INTRACAUDAL; PERINEURAL at 10:45

## 2020-06-29 RX ADMIN — METOPROLOL TARTRATE 50 MG: 50 TABLET, FILM COATED ORAL at 08:31

## 2020-06-29 RX ADMIN — APIXABAN 5 MG: 5 TABLET, FILM COATED ORAL at 08:31

## 2020-06-29 NOTE — PROCEDURES
MICA/Electrical Cardioversion  Date/Time: 6/29/2020 10:59 AM  Performed by: Román Stone MD  Authorized by: Román Stone MD     Written consent obtained?: Yes    Consent given by:  Patient  Patient states understanding of procedure being performed: Yes    Patient's understanding of procedure matches consent: Yes    Procedure consent matches procedure scheduled: Yes    Relevant documents present and verified: Yes    Test results available and properly labeled: Yes    Patient identity confirmed:  Verbally with patient  Patient sedated: Yes    Sedation type: moderate (conscious) sedation    Sedation:  Propofol  Cardioversion basis:  Elective  Pre-procedure rhythm:  Atrial fibrillation  Position: Patient was placed in a supine position    Chest area exposed: Chest area was exposed    Electrodes:  Pads  Electrodes placed:  Anterior-posterior  Number of attempts:  1  Attempt 1:     Attempt 1 mode:  Synchronous    Attempt 1 waveform:  Monophasic    Attempt 1 shock (Joules):  200    Attempt 1 outcome:  Conversion to normal sinus rhythm  Post-procedure rhythm:  Normal sinus rhythm  Complications: no complications    Patient tolerance:  Patient tolerated the procedure well with no immediate complications

## 2020-06-29 NOTE — DISCHARGE INSTRUCTIONS
A-fib (Atrial Fibrillation)   WHAT YOU NEED TO KNOW:   A-fib may come and go, or it may be a long-term condition  A-fib can cause blood clots, stroke, or heart failure  These conditions may become life-threatening  It is important to treat and manage a-fib to help prevent a blood clot, stroke, or heart failure  DISCHARGE INSTRUCTIONS:   Call 911 for any of the following:   · You have any of the following signs of a heart attack:      ¨ Squeezing, pressure, or pain in your chest that lasts longer than 5 minutes or returns    ¨ Discomfort or pain in your back, neck, jaw, stomach, or arm     ¨ Trouble breathing    ¨ Nausea or vomiting    ¨ Lightheadedness or a sudden cold sweat, especially with chest pain or trouble breathing    · You have any of the following signs of a stroke:      ¨ Numbness or drooping on one side of your face     ¨ Weakness in an arm or leg    ¨ Confusion or difficulty speaking    ¨ Dizziness, a severe headache, or vision loss  Seek care immediately if:  You have any of the following signs of a blood clot:  · You feel lightheaded, are short of breath, and have chest pain  · You cough up blood  · You have swelling, redness, pain, or warmth in your arm or leg  Contact your cardiologist or healthcare provider if:   · Your heart rate is higher than your healthcare provider said it should be  · You have new or worsening swelling in your legs, feet, ankles, or abdomen  · You are short of breath, even at rest      · You have questions or concerns about your condition or care  Medicines: You may need any of the following:  · Heart medicines  help control your heart rate and rhythm  You may need more than one medicine to treat your symptoms  · Blood thinners    help prevent blood clots  Examples of blood thinners include heparin and warfarin  Clots can cause strokes, heart attacks, and death   The following are general safety guidelines to follow while you are taking a blood thinner:    ¨ Watch for bleeding and bruising while you take blood thinners  Watch for bleeding from your gums or nose  Watch for blood in your urine and bowel movements  Use a soft washcloth on your skin, and a soft toothbrush to brush your teeth  This can keep your skin and gums from bleeding  If you shave, use an electric shaver  Do not play contact sports  ¨ Tell your dentist and other healthcare providers that you take anticoagulants  Wear a bracelet or necklace that says you take this medicine  ¨ Do not start or stop any medicines unless your healthcare provider tells you to  Many medicines cannot be used with blood thinners  ¨ Tell your healthcare provider right away if you forget to take the medicine, or if you take too much  ¨ Warfarin  is a blood thinner that you may need to take  The following are things you should be aware of if you take warfarin  § Foods and medicines can affect the amount of warfarin in your blood  Do not make major changes to your diet while you take warfarin  Warfarin works best when you eat about the same amount of vitamin K every day  Vitamin K is found in green leafy vegetables and certain other foods  Ask for more information about what to eat when you are taking warfarin  § You will need to see your healthcare provider for follow-up visits when you are on warfarin  You will need regular blood tests  These tests are used to decide how much medicine you need  · Antiplatelets , such as aspirin, help prevent blood clots  Take your antiplatelet medicine exactly as directed  These medicines make it more likely for you to bleed or bruise  If you are told to take aspirin, do not take acetaminophen or ibuprofen instead  · Take your medicine as directed  Contact your healthcare provider if you think your medicine is not helping or if you have side effects  Tell him or her if you are allergic to any medicine   Keep a list of the medicines, vitamins, and herbs you take  Include the amounts, and when and why you take them  Bring the list or the pill bottles to follow-up visits  Carry your medicine list with you in case of an emergency  Follow up with your cardiologist as directed: You will need regular blood tests and monitoring  Write down your questions so you remember to ask them during your visits  Manage A-fib:   · Know your target heart rate  Learn how to take your pulse and monitor your heart rate  · Manage other health conditions  This includes high blood pressure, sleep apnea, thyroid disease, diabetes, and other heart conditions  Take medicine as directed and follow your treatment plan  · Limit or do not drink alcohol  Alcohol can make a-fib hard to manage  Ask your healthcare provider if it is safe for you to drink alcohol  A drink of alcohol is 12 ounces of beer, 5 ounces of wine, or 1½ ounces of liquor  · Do not smoke  Nicotine and other chemicals in cigarettes and cigars can cause heart and lung damage  Ask your healthcare provider for information if you currently smoke and need help to quit  E-cigarettes or smokeless tobacco still contain nicotine  Talk to your healthcare provider before you use these products  · Eat heart-healthy foods  Heart healthy foods will help keep your cholesterol low  These include fruits, vegetables, whole-grain breads, low-fat dairy products, beans, lean meats, and fish  Replace butter and margarine with heart-healthy oils such as olive oil and canola oil  · Maintain a healthy weight  Ask your healthcare provider how much you should weigh  Ask him to help you create a weight loss plan if you are overweight  · Exercise for 30 minutes  most days of the week  Ask your healthcare provider about the best exercise plan for you  © 2017 [employer] Information is for End User's use only and may not be sold, redistributed or otherwise used for commercial purposes   All illustrations and images included in CareNotes® are the copyrighted property of A D A M , Inc  or Lizandro Nino  The above information is an  only  It is not intended as medical advice for individual conditions or treatments  Talk to your doctor, nurse or pharmacist before following any medical regimen to see if it is safe and effective for you

## 2020-06-29 NOTE — SEDATION DOCUMENTATION
MICA guided cardioversion successfully completed  Patient tolerated well and remains in NSR  Awake and instructed not to ambulate unless RN is in the room  Report and care assumed by primary RN

## 2020-06-29 NOTE — ASSESSMENT & PLAN NOTE
Patient present to the emergency room complaining of dizziness since yesterday morning and not feeling well which has gotten progressively worse today  Patient noted to be in atrial fibrillation with rapid ventricular rate in the ED  Patient received 15 milligram Cardizem bolus in the ED and was started on Cardizem drip  Patient was initially on Cardizem drip  Patient was later started on Cardizem 30 milligram p o  Q 6 hours and metoprolol 37 5 milligram p o  B i d  Patient is currently controlled on metoprolol 50 milligram p o  B i d  Patient's initial troponin was negative  TSH was normal  Patient to be scheduled for overnight pulse oximetry rule out sleep apnea  Serial troponins were negative  patient was initially on Lovenox and was transitioned to Eliquis  2D echo showed EF of 50-55%, mild-to-moderate mitral regurgitation  Patient had overnight pulse oximetry which showed AHI index of 4 and patient's went 7 minutes with O2 saturation less than 90%  MICA showed no evidence of left atrial thrombus right-to-left shunt  Patient underwent successful cardioversion to sinus rhythm    Patient to be discharged on metoprolol and Eliquis with outpatient follow-up with Cardiology

## 2020-06-29 NOTE — PROGRESS NOTES
Progress Note - Cardiology   [facility]     Christina Posadas 48 y o  male MRN: [id number]  : 1965  Unit/Bed#: [address] [place] [address] Encounter: [id number]    Assessment and Plan:   1  New onset atrial fibrillation of unknown duration:  Most likely greater than 24 hours due to patient's symptomatology  Rate controlled with beta-blocker  CHADS2 Vasc = 1, started on Eliquis 5 mg b i d  Overnight pulse oximetry with AHI of 4 and no desaturations  For MICA guided cardioversion today      2  Hypertension:  Blood pressure is stable with beta-blocker     3  Dyslipidemia     Subjective / Objective:   Patient seen examined  Heart rates have been 90s to low 100s on metoprolol 50 mg b i d   Patient has been on Eliquis 5 mg b i d  x 2 days  Patient for MICA guided cardioversion today  Vitals: Blood pressure 134/99, pulse 101, temperature 98 4 °F (36 9 °C), temperature source Oral, resp  rate 18, height 5' 9" (1 753 m), weight 102 kg (225 lb 15 5 oz), SpO2 94 %  Vitals:    20 1440 20 1835   Weight: 103 kg (226 lb 3 1 oz) 102 kg (225 lb 15 5 oz)     Body mass index is 33 37 kg/m²  BP Readings from Last 3 Encounters:   20 134/99     Orthostatic Blood Pressures      Most Recent Value   Blood Pressure  134/99 filed at 2020 0801   Patient Position - Orthostatic VS  Lying filed at 2020 0801        I/O        07 -  0700  07 -  0700  07 -  0700    P  O  880 180     I V  (mL/kg) 31 8 (0 3)      Total Intake(mL/kg) 911 8 (8 9) 180 (1 8)     Net +911 8 +180            Unmeasured Urine Occurrence 6 x      Unmeasured Stool Occurrence 2 x          Invasive Devices     Peripheral Intravenous Line            Peripheral IV 20 Right Antecubital 2 days                  Intake/Output Summary (Last 24 hours) at 2020 0830  Last data filed at 2020 0901  Gross per 24 hour   Intake 180 ml   Output    Net 180 ml         Physical Exam: Physical Exam   Constitutional: He is oriented to person, place, and time  He appears well-developed and well-nourished  No distress  HENT:   Head: Normocephalic  Right Ear: External ear normal    Left Ear: External ear normal    Eyes: Pupils are equal, round, and reactive to light  Conjunctivae are normal  Right eye exhibits no discharge  Left eye exhibits no discharge  No scleral icterus  Neck: Normal range of motion  Neck supple  No JVD present  No thyromegaly present  Cardiovascular: Intact distal pulses and normal pulses  An irregular rhythm present  Tachycardia present  No murmur heard  Pulmonary/Chest: Effort normal and breath sounds normal  No respiratory distress  He has no wheezes  He has no rales  Abdominal: Soft  Bowel sounds are normal  He exhibits no distension  Musculoskeletal: He exhibits no edema  Neurological: He is alert and oriented to person, place, and time  Skin: Skin is warm and dry  Capillary refill takes less than 2 seconds  He is not diaphoretic  Psychiatric: He has a normal mood and affect  His behavior is normal  Judgment and thought content normal    Nursing note and vitals reviewed                   Medications/ Allergies:     Current Facility-Administered Medications:  acetaminophen 650 mg Oral Q6H PRN Xiomara Joshua MD   apixaban 5 mg Oral BID ANANTH Arriaga   atorvastatin 80 mg Oral Daily Xiomara Joshua MD   metoprolol tartrate 50 mg Oral Q12H [address] ANANTH Arriaga   ondansetron 4 mg Intravenous Q6H PRN Xiomara Joshua MD       acetaminophen 650 mg Q6H PRN   ondansetron 4 mg Q6H PRN     No Known Allergies    VTE Pharmacologic Prophylaxis:   Sequential compression device (Venodyne)     Labs:   Troponins:  Results from last 7 days   Lab Units 06/27/20 0450 06/26/20 2011 06/26/20  1448   TROPONIN I ng/mL <0 02 <0 02 <0 02     CBC with diff:  Results from last 7 days   Lab Units 06/27/20  0450 06/26/20  1448   WBC Thousand/uL 9 91 14 31* HEMOGLOBIN g/dL 14 3 16 2   HEMATOCRIT % 43 5 48 7   MCV fL 91 88   PLATELETS Thousands/uL 205 252   MCH pg 29 9 29 4   MCHC g/dL 32 9 33 3   RDW % 12 7 12 4   MPV fL 11 4 11 4   NRBC AUTO /100 WBCs 0 0     CMP:  Results from last 7 days   Lab Units 06/29/20  0541 06/27/20  0450 06/26/20  1448   SODIUM mmol/L 138 140 142   POTASSIUM mmol/L 4 3 3 8 4 3   CHLORIDE mmol/L 104 106 105   CO2 mmol/L 23 24 24   ANION GAP mmol/L 11 10 13   BUN mg/dL 19 20 23   CREATININE mg/dL 1 00 1 08 1 66*   CALCIUM mg/dL 8 4 7 9* 9 4   AST U/L  --   --  21   ALT U/L  --   --  45   ALK PHOS U/L  --   --  78   TOTAL PROTEIN g/dL  --   --  7 8   ALBUMIN g/dL  --   --  4 4   TOTAL BILIRUBIN mg/dL  --   --  1 30*   EGFR ml/min/1 73sq m 86 78 47     Magnesium:  Results from last 7 days   Lab Units 06/27/20  0450 06/26/20  1448   MAGNESIUM mg/dL 2 2 2 0     Coags:  Results from last 7 days   Lab Units 06/26/20  1448   PTT seconds 25   INR  0 98     TSH:  Results from last 7 days   Lab Units 06/26/20  1448   TSH 3RD GENERATON uIU/mL 2 998     Lipid Profile:  Results from last 7 days   Lab Units 06/27/20  0450   CHOLESTEROL mg/dL 102   TRIGLYCERIDES mg/dL 91   HDL mg/dL 34*   LDL CALC mg/dL 50     NT-proBNP:   Recent Labs     06/26/20  1448   NTBNP 3,724*        Imaging & Testing   I have personally reviewed pertinent reports  Xr Chest 1 View Portable    Result Date: 6/26/2020  Narrative: CHEST INDICATION:   atrial fib  COMPARISON:  None EXAM PERFORMED/VIEWS:  XR CHEST PORTABLE FINDINGS: Cardiomediastinal silhouette appears unremarkable  The lungs are clear  No pneumothorax or pleural effusion  Osseous structures appear within normal limits for patient age  Impression: No acute cardiopulmonary disease  Workstation performed: [id number]     Ct Head Without Contrast    Result Date: 6/26/2020  Narrative: CT BRAIN - WITHOUT CONTRAST INDICATION:   Dizziness, non-specific  COMPARISON:  None   TECHNIQUE:  CT examination of the brain was performed  In addition to axial images, coronal 2D reformatted images were created and submitted for interpretation  Radiation dose length product (DLP) for this visit:  885 65 mGy-cm   This examination, like all CT scans performed in the [facility], was performed utilizing techniques to minimize radiation dose exposure, including the use of iterative  reconstruction and automated exposure control  IMAGE QUALITY:  Diagnostic  FINDINGS: PARENCHYMA:  No intracranial mass, mass effect or midline shift  No CT signs of acute infarction  No acute parenchymal hemorrhage  VENTRICLES AND EXTRA-AXIAL SPACES:  Normal for the patient's age  VISUALIZED ORBITS AND PARANASAL SINUSES:  Unremarkable  CALVARIUM AND EXTRACRANIAL SOFT TISSUES:  Normal      Impression: No acute intracranial abnormality  Workstation performed: [id number]        EKG / Monitor: Personally reviewed  Atrial fibrillation, rates have been in the 90s to low 100s  Cardiac testing:   Results for orders placed during the hospital encounter of 20   Echo complete with contrast if indicated    Narrative Dede [address]  [address] [facility]  [phone]    Transthoracic Echocardiogram  2D, M-mode, Doppler, and Color Doppler    Study date:  2020    Patient: Frantz Florez  MR number: [id number]  Account number: [de-identified]  : 1965  Age: 48 years  Gender: Male  Status: Inpatient  Location: Bedside  Height: 69 in  Weight: 224 6 lb  BP: 129/ 66 mmHg    Indications: Atrial Fibrillation    Diagnoses: 427 31 - ATRIAL FIBRILLATION    Sonographer:  CLAUDETTE Sifuentes  Referring Physician:  Corinne Jerry MD  Group:  [id number]  Interpreting Physician:  Angel Philip MD    SUMMARY    LEFT VENTRICLE:  Systolic function was at the lower limits of normal  Ejection fraction was estimated in the range of 50 % to 55 % to be 55 %    There were no regional wall motion abnormalities  Wall thickness was mildly increased  There was mild concentric hypertrophy  RIGHT VENTRICLE:  The size was at the upper limits of normal     LEFT ATRIUM:  The atrium was mildly to moderately dilated  RIGHT ATRIUM:  The atrium was mildly dilated  MITRAL VALVE:  There was mild to moderate regurgitation  TRICUSPID VALVE:  There was trace regurgitation  Estimated peak PA pressure was 25 mmHg  IVC, HEPATIC VEINS:  The respirophasic change in diameter was more than 50%  HISTORY: PRIOR HISTORY: HTN, A-Fib, HLD    PROCEDURE: The procedure was performed at the bedside  This was a routine study  The transthoracic approach was used  The study included complete 2D imaging, M-mode, complete spectral Doppler, and color Doppler  The heart rate was 92 bpm,  at the start of the study  Image quality was adequate  LEFT VENTRICLE: Size was normal  Systolic function was at the lower limits of normal  Ejection fraction was estimated in the range of 50 % to 55 % to be 55 %  There were no regional wall motion abnormalities  Wall thickness was mildly  increased  There was mild concentric hypertrophy  DOPPLER: The study was not technically sufficient to allow evaluation of LV diastolic function  RIGHT VENTRICLE: The size was at the upper limits of normal  Systolic function was normal  Wall thickness was normal     LEFT ATRIUM: The atrium was mildly to moderately dilated  RIGHT ATRIUM: The atrium was mildly dilated  MITRAL VALVE: There was normal leaflet separation  DOPPLER: The transmitral velocity was within the normal range  There was no evidence for stenosis  There was mild to moderate regurgitation  AORTIC VALVE: The valve was trileaflet  Leaflets exhibited mildly increased thickness and normal cuspal separation  DOPPLER: Transaortic velocity was within the normal range  There was no evidence for stenosis  There was no regurgitation      TRICUSPID VALVE: DOPPLER: There was trace regurgitation  Estimated peak PA pressure was 25 mmHg  PULMONIC VALVE: DOPPLER: There was no significant regurgitation  PERICARDIUM: There was no thickening or calcification  There was no pericardial effusion  AORTA: The root exhibited normal size  SYSTEMIC VEINS: IVC: The inferior vena cava was normal in size  The respirophasic change in diameter was more than 50%  SYSTEM MEASUREMENT TABLES    2D mode  AoR Diam 2D: 3 2 cm  LA Diam (2D): 4 5 cm  LA/Ao (2D): 1 41  FS (2D Teich): 24 2 %  IVSd (2D): 1 31 cm  LVDEV: 92 9 cmï¾³  LVESV: 48 1 cmï¾³  LVIDd(2D): 4 51 cm  LVISd (2D): 3 42 cm  LVPWd (2D): 1 24 cm  SV (Teich): 44 8 cmï¾³    Apical four chamber  LVEF A4C: 49 %    Unspecified Scan Mode  MV Peak E Jae  Mean: 933 mm/s  MVA (PHT): 4 15 cmï¾²  PHT: 53 ms  Max P mm[Hg]  V Max: 2240 mm/s  Vmax: 2250 mm/s  RA Area: 19 2 cmï¾²  RA Volume: 56 3 cmï¾³  TAPSE: 2 1 cm    [employer] Accredited Echocardiography Laboratory    Prepared and electronically signed by    Fanny Wilkinson MD  Signed 2020 10:26:58         Sanaz Urbina        "This note has been constructed using a voice recognition system  Therefore there may be syntax, spelling, and/or grammatical errors   Please call if you have any questions  "

## 2020-06-29 NOTE — SOCIAL WORK
Dr Zachary Montilla informed CM pt will be discharged on Eliquis and requests CM check if pt will have a copay  Eliquis script Escribed to pts pharmacy- [facility] [address], [place]  Soon after CM contacted [facility] pharmacist who stated pts copay is $283 47/month, pt can use the  discount coupons and they have the medication/dose in stock  CM informed Dr Zachary Montilla of same  Explained role of CM to pt, informed pt of same and provided pt with the  first 30 days free and for $10/month  Lives with:his spouse  DME:None  [place]:[employer]  Drives:Yes  Pharmacy: [address], [place] for his medications  PCP: [address] [place] [address] AT [facility] Hx: Rehab Hx:No hx  POA/LW:No, declined information  Transport at [facility] will transport pt home  Pt denies any difficulty affording his medications and denies any discharge needs and this time  CM reviewed discharge planning process including the following: identifying caregivers at home, preference for d/c planning needs, availability of treatment team to discuss questions or concerns patient and/or family may have regarding diagnosis, plan of care, medications and discharge planning   CM will continue to follow for care coordination and update assessment as appropriate

## 2020-06-29 NOTE — ASSESSMENT & PLAN NOTE
Likely secondary to atrial fibrillation with rapid ventricular rate  CT scan of the brain was negative  Dizziness likely related to atrial fibrillation with rapid ventricular rate  Symptoms have resolved  Telemetry showed controlled atrial fib initially without any significant arrhythmias  2D echo showed no significant valvular abnormalities with normal EF

## 2020-06-29 NOTE — NURSING NOTE
Education completed with the patient at the bedside  The patient is discharged to home in no distress, was ambulatory, gait appeared steady

## 2020-06-29 NOTE — DISCHARGE SUMMARY
Discharge- Christina Enter 1965, 48 y o  male MRN: [id number]    Unit/Bed#: [address] [place] [address] Encounter: [id number]    Primary Care Provider: No primary care provider on file  Date and time admitted to hospital: 6/26/2020  2:38 PM        * Atrial fibrillation with rapid ventricular response [facility])  Assessment & Plan  Patient present to the emergency room complaining of dizziness since yesterday morning and not feeling well which has gotten progressively worse today  Patient noted to be in atrial fibrillation with rapid ventricular rate in the ED  Patient received 15 milligram Cardizem bolus in the ED and was started on Cardizem drip  Patient was initially on Cardizem drip  Patient was later started on Cardizem 30 milligram p o  Q 6 hours and metoprolol 37 5 milligram p o  B i d  Patient is currently controlled on metoprolol 50 milligram p o  B i d  Patient's initial troponin was negative  TSH was normal  Patient to be scheduled for overnight pulse oximetry rule out sleep apnea  Serial troponins were negative  patient was initially on Lovenox and was transitioned to Eliquis  2D echo showed EF of 50-55%, mild-to-moderate mitral regurgitation  Patient had overnight pulse oximetry which showed AHI index of 4 and patient's went 7 minutes with O2 saturation less than 90%  MICA showed no evidence of left atrial thrombus right-to-left shunt  Patient underwent successful cardioversion to sinus rhythm    Patient to be discharged on metoprolol and Eliquis with outpatient follow-up with Cardiology    Dizziness  Assessment & Plan  Likely secondary to atrial fibrillation with rapid ventricular rate  CT scan of the brain was negative  Dizziness likely related to atrial fibrillation with rapid ventricular rate  Symptoms have resolved  Telemetry showed controlled atrial fib initially without any significant arrhythmias  2D echo showed no significant valvular abnormalities with normal EF    Hypertension  Assessment & Plan  Patient was on lisinopril which was held as patient has been on Cardizem drip  Patient currently is on metoprolol 50 milligram p o  B i d  Hyperlipidemia  Assessment & Plan  Continue Crestor          Discharging Physician / Practitioner: Cassie Harrell MD  PCP: No primary care provider on file  Admission Date:   Admission Orders (From admission, onward)     Ordered        06/26/20 1737  Inpatient Admission (expected length of stay for this patient Order details is greater than two midnights)  Once                   Discharge Date: 06/29/20    Resolved Problems  Date Reviewed: 6/29/2020          Resolved    Leukocytosis 6/27/2020     Resolved by  Cassie Harrell MD    Elevated serum creatinine 6/28/2020     Resolved by  Cassie Harrell MD          Consultations During Hospital Stay:  · Cardiology    Procedures Performed:   · MICA with cardioversion  · Echo showed EF of 50-55% without any regional wall motion abnormalities, mild concentric hypertrophy    Complications:  None    Reason for Admission:  Dizziness    Hospital Course:     Tima Bower is a 48 y o  male patient with past medical history hypertension, hyperlipidemia who originally presented to the hospital on 6/26/2020 due to dizziness  In the ED patient was noted to be in atrial fibrillation with rapid ventricular rate  CT scan of the brain was negative  Patient started on IV Cardizem drip and was later transitioned to p o  Metoprolol  Patient's serial troponins were negative and patient had overnight pulse oximetry which showed no evidence of sleep apnea  2D echo showed normal ejection fraction  Patient was initially on Lovenox and was later transitioned to Eliquis  Patient later underwent MICA with cardioversion to sinus rhythm  Patient remained stable and will be discharged home with outpatient follow-up with Cardiology  Please see above list of diagnoses and related plan for additional information       Condition at Discharge: stable     Discharge Day Visit / Exam:     Subjective:  Patient denies any chest pain, shortness of breath, palpitations, headache or dizziness  Vitals: Blood Pressure: 121/80 (06/29/20 1131)  Pulse: 67 (06/29/20 1131)  Temperature: 98 3 °F (36 8 °C) (06/29/20 1131)  Temp Source: Oral (06/29/20 0801)  Respirations: 18 (06/29/20 1131)  Height: 5' 9" (175 3 cm) (06/26/20 1835)  Weight - Scale: 102 kg (225 lb 15 5 oz) (06/26/20 1835)  SpO2: 95 % (06/29/20 1131)  Exam:   Physical Exam   Constitutional: No distress  HENT:   Head: Normocephalic and atraumatic  Eyes: Pupils are equal, round, and reactive to light  Conjunctivae are normal    Neck: Normal range of motion  Neck supple  Cardiovascular: Normal rate, regular rhythm and normal heart sounds  Pulmonary/Chest: Effort normal  No respiratory distress  He has no wheezes  He has no rhonchi  He has no rales  He exhibits no tenderness  Abdominal: Soft  Bowel sounds are normal  He exhibits no distension  There is no tenderness  There is no rebound and no guarding  Musculoskeletal: He exhibits no edema  Neurological: He is alert  No cranial nerve deficit  Skin: Skin is warm and dry  No rash noted  Discharge instructions/Information to patient and family:   See after visit summary for information provided to patient and family  Provisions for Follow-Up Care:  See after visit summary for information related to follow-up care and any pertinent home health orders  Disposition:     Home    Planned Readmission: No     Discharge Statement:  I spent 35 minutes discharging the patient  This time was spent on the day of discharge  I had direct contact with the patient on the day of discharge  Greater than 50% of the total time was spent examining patient, answering all patient questions, arranging and discussing plan of care with patient as well as directly providing post-discharge instructions    Additional time then spent on discharge activities  Discharge Medications:  See after visit summary for reconciled discharge medications provided to patient and family        ** Please Note: This note has been constructed using a voice recognition system **

## 2020-06-29 NOTE — UTILIZATION REVIEW
Initial Clinical Review    Admission: Date/Time/Statement: Admission Orders (From admission, onward)     Ordered        06/26/20 1737  Inpatient Admission (expected length of stay for this patient Order details is greater than two midnights)  Once                Orders Placed This Encounter   Procedures    Inpatient Admission (expected length of stay for this patient Order details is greater than two midnights)     Standing Status:   Standing     Number of Occurrences:   1     Order Specific Question:   Admitting Physician     Answer:   Martita Velasco     Order Specific Question:   Level of Care     Answer:   Med Surg [16]     Order Specific Question:   Estimated length of stay     Answer:   More than 2 Midnights     Order Specific Question:   Certification     Answer:   I certify that inpatient services are medically necessary for this patient for a duration of greater than two midnights  See H&P and MD Progress Notes for additional information about the patient's course of treatment  ED Arrival Information     Expected Arrival Acuity Means of Arrival Escorted By Service Admission Type    - 6/26/2020 14:32 Emergent Ambulance [facility]    Arrival Complaint    weakness     Chief Complaint   Patient presents with    Rapid Heart Rate     Pt arrives with BLS from care now, denies any CP hx of HTN  Assessment/Plan: 48year old male with PMHx HTN, HLD  Presented to [facility] ED from [facility] via EMS 2nd  Atrial Fib with RVR   1 day history of lightheadedness and dizziness with movement "not feeling well with generalized weakness and nausea"  Reports going to work today as a  with dizziness much worse especially with sudden movements  He went home in the afternoon and asked his wife to check his blood pressure which was low  In ED - EKG showed atrial fibrillation with rapid ventricular rate 150/min    CT brain negative  LABS:  WBC 14,310  BNP 3,724    ED Tx; IVF NSS x 2 L, IV Cadizem bolus + gtt, Lovenox, IV zofran   Admitted inpatient 6/26/20 at 1737 2nd Atrial fibrillation with rapid ventricular response - IV Cardizem gtt, ECHO, Cardiology consulted    ED Triage Vitals   Temperature Pulse Respirations Blood Pressure SpO2   06/26/20 1441 06/26/20 1440 06/26/20 1440 06/26/20 1440 06/26/20 1440   97 5 °F (36 4 °C) (!) 150 18 125/82 98 %      Temp Source Heart Rate Source Patient Position - Orthostatic VS BP Location FiO2 (%)   06/26/20 1441 06/26/20 1440 06/26/20 1440 06/26/20 1440 --   Tympanic Monitor Lying Left arm     Wt Readings from Last 1 Encounters:   06/26/20 102 kg (225 lb 15 5 oz)     Additional Vital Signs:   6/26/20 2322  98 °F (36 7 °C)  89  16  111/80    92 %  None (Room air)    06/26/20 21:14:58    75    107/81  90  96 %  None (Room air)    06/26/20 19:23:25  98 1 °F (36 7 °C)  77  17  117/78  91  94 %  None (Room air)    06/26/20 1918    95              06/26/20 18:35:30  98 2 °F (36 8 °C)  78  20  117/78  91  94 %      06/26/20 1745    110Abnormal   20  116/58  80  97 %      06/26/20 1730    104  13  118/62  83  95 %      06/26/20 1715    101  15  123/68  85  95 %      06/26/20 1700    108Abnormal   13  130/69  87  97 %      06/26/20 1645    122Abnormal   20  122/88  100  97 %      06/26/20 1630    115Abnormal   19  124/80            Pertinent Labs/Diagnostic Test Results:     Results from last 7 days   Lab Units 06/26/20  1448   WBC Thousand/uL 14 31*   HEMOGLOBIN g/dL 16 2   HEMATOCRIT % 48 7   PLATELETS Thousands/uL 252   NEUTROS ABS Thousands/µL 10 98*       Results from last 7 days   Lab Units 06/26/20  1448   SODIUM mmol/L 142   POTASSIUM mmol/L 4 3   CHLORIDE mmol/L 105   CO2 mmol/L 24   ANION GAP mmol/L 13   BUN mg/dL 23   CREATININE mg/dL 1 66*   EGFR ml/min/1 73sq m 47   CALCIUM mg/dL 9 4   MAGNESIUM mg/dL 2 0     Results from last 7 days   Lab Units 06/26/20  1448   AST U/L 21   ALT U/L 45 ALK PHOS U/L 78   TOTAL PROTEIN g/dL 7 8   ALBUMIN g/dL 4 4   TOTAL BILIRUBIN mg/dL 1 30*     Results from last 7 days   Lab Units 06/27/20  0450 06/26/20  1448   GLUCOSE RANDOM mg/dL 112 150*     Results from last 7 days   Lab Units 06/26/20 2011 06/26/20  1448   TROPONIN I ng/mL <0 02 <0 02     Results from last 7 days   Lab Units 06/26/20  1448   PROTIME seconds 13 3   INR  0 98   PTT seconds 25     Results from last 7 days   Lab Units 06/26/20  1448   TSH 3RD GENERATON uIU/mL 2 998     Results from last 7 days   Lab Units 06/26/20  1448   NT-PRO BNP pg/mL 3,724*     Results from last 7 days   Lab Units 06/26/20 2014   CLARITY UA  Clear   COLOR UA  Yellow   SPEC GRAV UA  1 025   PH UA  6 0   GLUCOSE UA mg/dl Negative   KETONES UA mg/dl 40 (2+)*   BLOOD UA  Trace-Intact*   PROTEIN UA mg/dl Negative   NITRITE UA  Negative   BILIRUBIN UA  Negative   UROBILINOGEN UA E U /dl 0 2   LEUKOCYTES UA  Negative   WBC UA /hpf 2-4*   RBC UA /hpf 4-10*   BACTERIA UA /hpf Innumerable*   EPITHELIAL CELLS WET PREP /hpf Occasional   MUCUS THREADS  Moderate*       EKG -  Atrial fibrillation with rapid ventricular response  Abnormal QRS-T angle, consider primary T wave abnormality  Abnormal ECG  No previous ECGs available    CHEST X RAY -  No acute cardiopulmonary disease      ED Treatment:   Medication Administration from 06/26/2020 1432 to 06/26/2020 1826       Date/Time Order Dose Route Action     06/26/2020 1500 diltiazem (CARDIZEM) injection 15 mg 15 mg Intravenous Given     06/26/2020 1448 ondansetron (ZOFRAN) injection 4 mg 4 mg Intravenous Given     06/26/2020 1555 sodium chloride 0 9 % bolus 1,000 mL 0 mL Intravenous Stopped     06/26/2020 1448 sodium chloride 0 9 % bolus 1,000 mL 1,000 mL Intravenous New Bag     06/26/2020 1658 diltiazem (CARDIZEM) 125 mg in sodium chloride 0 9 % 125 mL infusion 12 5 mg/hr Intravenous Rate/Dose Change     06/26/2020 1638 diltiazem (CARDIZEM) 125 mg in sodium chloride 0 9 % 125 mL infusion 10 mg/hr Intravenous Rate/Dose Change     06/26/2020 1617 diltiazem (CARDIZEM) 125 mg in sodium chloride 0 9 % 125 mL infusion 7 5 mg/hr Intravenous Rate/Dose Change     06/26/2020 1547 diltiazem (CARDIZEM) 125 mg in sodium chloride 0 9 % 125 mL infusion 5 mg/hr Intravenous New Bag     06/26/2020 1618 enoxaparin (LOVENOX) subcutaneous injection 100 mg 100 mg Subcutaneous Given     Past Medical History:   Diagnosis Date    Hyperlipidemia     Hypertension      Admitting Diagnosis: Rapid heart rate [R00 0]  Atrial fibrillation with rapid ventricular response ([address] ) [I48 91]    Age/Sex: 48 y o  male    Admission Orders:  Telemetry  VS q4hrs  Activity as tolerated  Diet Cardiovascular; Cardiac    Scheduled Medications:  apixaban 5 mg Oral BID   atorvastatin 80 mg Oral Daily   metoprolol tartrate 37 5 mg Oral Q12H [facility] [place] [facility]     Continuous IV Infusions:  IVF diltiazem (CARDIZEM) 125 mg in sodium chloride 0 9 % 125 mL infusion     Ordered Dose: 5 mg/hr Route: Intravenous Frequency: Continuous @ 5 mL/hr   Scheduled Start Date/Time: 06/26/20 1930         PRN Meds:  acetaminophen 650 mg Oral Q6H PRN   ondansetron 4 mg Intravenous Q6H PRN     IP CONSULT TO CARDIOLOGY    Network Utilization Review Department  [email or web address]  ATTENTION: Please call with any questions or concerns to [phone] and carefully listen to the prompts so that you are directed to the right person  All voicemails are confidential   Jael Lopez all requests for admission clinical reviews, approved or denied determinations and any other requests to dedicated fax number below belonging to the campus where the patient is receiving treatment   List of dedicated fax numbers for the Facilities:  [address] DENIALS (Administrative/Medical Necessity) [phone]   [address] (Maternity/NICU/Pediatrics) [facility] [address] [phone] Cynthia Reed [phone]   Karyn Hall 1525 [facility] [phone]   Carolyn Pals [phone] 2205 [facility]  [phone]   [address] [place] [address] [phone]   [facility] [phone]           Continued Stay Review    Date: 6/27/20 Saturday                   Current Patient Class: Inpatient   Current Level of Care: Acute Med Surg with Telemetry    HPI:   48year old male with PMHx HTN, HLD  Initially admitted on  6/26/20 2nd Atrial fibrillation with rapid ventricular response on  IV Cardizem gtt     Assessment/Plan:   *Currently atrial fibrillation with heart rate around  beats per minute  being transitioned to po Cardizem and started metoprolol po  B i d  Serial Troponin negative x 3  Plan anticoagulation with Lovenox and will be transitioned to Eliquis  2D echo showed EF of 50-55%, mild-to-moderate mitral regurgitation    6/27/20 Cardiology Consult:  *Currently on the monitor atrial fibrillation with heart rate around  beats per minute  Atrial fibrillation with rapid ventricular rate  Most likely new onset  Currently heart rate is much better  Will add metoprolol and change Lovenox to Eliquis  Discussed various options including staying here and MICA guided cardioversion on Monday or possibly discharging home if his heart rate persistently below 80 to 90 and an outpatient MICA guided cardioversion next week      Echo Doppler shows low normal to normal LV systolic function EF 29-37%, mild mild to moderate MR with mild TR normal LV systolic function  Left atrium is mild-to-moderately dilated      Pertinent Labs/Diagnostic Results:     Results from last 7 days   Lab Units 06/27/20  0450 06/26/20  1448   WBC Thousand/uL 9 91 14 31*   HEMOGLOBIN g/dL 14 3 16 2   HEMATOCRIT % 43 5 48 7   PLATELETS Thousands/uL 205 252   NEUTROS ABS Thousands/µL 6 09 10 98*     Results from last 7 days   Lab Units 20  0541 20  0450 20  1448   SODIUM mmol/L 138 140 142   POTASSIUM mmol/L 4 3 3 8 4 3   CHLORIDE mmol/L 104 106 105   CO2 mmol/L 23 24 24   ANION GAP mmol/L 11 10 13   BUN mg/dL 19 20 23   CREATININE mg/dL 1 00 1 08 1 66*   EGFR ml/min/1 73sq m 86 78 47   CALCIUM mg/dL 8 4 7 9* 9 4   MAGNESIUM mg/dL  --  2 2 2 0     Results from last 7 days   Lab Units 20  1448   AST U/L 21   ALT U/L 45   ALK PHOS U/L 78   TOTAL PROTEIN g/dL 7 8   ALBUMIN g/dL 4 4   TOTAL BILIRUBIN mg/dL 1 30*     Results from last 7 days   Lab Units 20  0541 20  0450 20  1448   GLUCOSE RANDOM mg/dL 97 112 150*     Results from last 7 days   Lab Units 20  0450 20  1448   TROPONIN I ng/mL <0 02 <0 02 <0 02     Results from last 7 days   Lab Units 20  1448   PROTIME seconds 13 3   INR  0 98   PTT seconds 25     Results from last 7 days   Lab Units 20  1448   TSH 3RD GENERATON uIU/mL 2 998     Results from last 7 days   Lab Units 20  1448   NT-PRO BNP pg/mL 3,724*     Results from last 7 days   Lab Units 20   CLARITY UA  Clear   COLOR UA  Yellow   SPEC GRAV UA  1 025   PH UA  6 0   GLUCOSE UA mg/dl Negative   KETONES UA mg/dl 40 (2+)*   BLOOD UA  Trace-Intact*   PROTEIN UA mg/dl Negative   NITRITE UA  Negative   BILIRUBIN UA  Negative   UROBILINOGEN UA E U /dl 0 2   LEUKOCYTES UA  Negative   WBC UA /hpf 2-4*   RBC UA /hpf 4-10*   BACTERIA UA /hpf Innumerable*   EPITHELIAL CELLS WET PREP /hpf Occasional   MUCUS THREADS  Moderate*     Vital Signs:   Temp (24hrs), Av 1 °F (36 7 °C), Min:97 9 °F (36 6 °C), Max:98 2 °F (36 8 °C)   Temp:  [97 9 °F (36 6 °C)-98 2 °F (36 8 °C)] 97 9 °F (36 6 °C)  HR:  [] 86  Resp:  [13-20] 20  BP: (107-147)/(58-86) 121/86  SpO2:  [92 %-98 %] 98 %  Body mass index is 33 37 kg/m²       Input and Output Summary (last 24 hours) 2020 1649  Gross per 24 hour   Intake 741 75 ml   Output    Net 741 75 ml     Diet Cardiovascular; Cardiac    Scheduled Medications:  apixaban 5 mg Oral BID   atorvastatin 80 mg Oral Daily   diltiazem 30 mg Oral Q6H [facility]   metoprolol tartrate 37 5 mg Oral Q12H [facility]     Continuous IV Infusions:  IVF diltiazem (CARDIZEM) 125 mg in sodium chloride 0 9 % 125 mL infusion           Ordered Dose: 5 mg/hr Route: Intravenous Frequency: Continuous @ 5 mL/hr   Scheduled Start Date/Time: 06/26/20 1930  D/C 6/27/20 1121          PRN Meds:    acetaminophen 650 mg Oral Q6H PRN   ondansetron 4 mg Intravenous Q6H PRN     Discharge Plan: To be determined   Inpatient Case Management following for all discharge needs    Network Utilization Review Department  [email or web address]  ATTENTION: Please call with any questions or concerns to [phone] and carefully listen to the prompts so that you are directed to the right person  All voicemails are confidential   Lynette Kussmaul all requests for admission clinical reviews, approved or denied determinations and any other requests to dedicated fax number below belonging to the campus where the patient is receiving treatment   List of dedicated fax numbers for the Facilities:  [address] [place] [address] DENIALS (Administrative/Medical Necessity) [phone]   [address] [place] [address] (Maternity/NICU/Pediatrics) [phone]   Olvin Daniela [phone]   [facility] [phone]   Alexander Garth [phone]   Eliverto Meals [phone]   [facility] [address] [phone]   [facility]  [phone]   220[address] [facility]  2401 [facility] 1000 [address] [facility] [phone]

## 2020-06-30 PROCEDURE — 93325 DOPPLER ECHO COLOR FLOW MAPG: CPT | Performed by: INTERNAL MEDICINE

## 2020-06-30 PROCEDURE — 93320 DOPPLER ECHO COMPLETE: CPT | Performed by: INTERNAL MEDICINE

## 2020-06-30 PROCEDURE — 93312 ECHO TRANSESOPHAGEAL: CPT | Performed by: INTERNAL MEDICINE

## 2020-06-30 NOTE — UTILIZATION REVIEW
Notification of Discharge  This is a Notification of Discharge from our facility [address]  Please be advised that this patient has been discharge from our facility  Below you will find the admission and discharge date and time including the patients disposition  PRESENTATION DATE: 6/26/2020  2:38 PM  OBS ADMISSION DATE:   IP ADMISSION DATE: 6/26/20 1737   DISCHARGE DATE: 6/29/2020  1:38 PM  DISPOSITION: Home/Self Care Home/Self Care   Admission Orders listed below:  Admission Orders (From admission, onward)     Ordered        06/26/20 1737  Inpatient Admission (expected length of stay for this patient Order details is greater than two midnights)  Once                   Please contact the UR Department if additional information is required to close this patient's authorization/case  [employer] Utilization Review Department  Main: [phone] x carefully listen to the prompts  All voicemails are confidential   [email or web address]  Send all requests for admission clinical reviews, approved or denied determinations and any other requests to dedicated fax number below belonging to the campus where the patient is receiving treatment   List of dedicated fax numbers:  [address] DENIALS (Administrative/Medical Necessity) [phone]   [address] (Maternity/NICU/Pediatrics) [phone]   [facility] [phone]   Susana Day [phone]   Acacia Ruiz [phone]    [facility] [address] [place] [address] [phone]   Gregorio Cheney [phone] 2205 [facility]  [address] [facility] 1000 [address] [facility] [phone]

## 2020-07-07 PROBLEM — I48.0 PAROXYSMAL ATRIAL FIBRILLATION (HCC): Status: ACTIVE | Noted: 2020-07-07

## 2020-07-07 NOTE — PROGRESS NOTES
Consultation - Cardiology Office  [facility]  Goldy Quinonez 48 y o  male MRN: [id number]  : 1965  Unit/Bed#:  Encounter: [id number]      Assessment:     1  Paroxysmal atrial fibrillation (HCC)    2  Dyslipidemia    3  Essential hypertension    4  Obesity (BMI 30-39  9)        Discussion summary and Plan:  1  Paroxysmal atrial fibrillation  Status post MICA guided cardioversion  He is maintaining sinus rhythm  In fact he is bradycardic  Will decrease metoprolol to 50 mg in the morning and 25 in the evening  If his reoccurrence of atrial fibrillation we may consider flecainide  To rule out any ischemia he will be scheduled for exercise stress test   His echo MICA reviewed with him  As he has low Nivia Vasc score he will finish his 4 weeks of Eliquis then he will be on aspirin 162 mg daily  2  Dyslipidemia  Continue Crestor cholesterol profile acceptable    3  Essential hypertension  Well controlled with metoprolol  Previously was on lisinopril  Will monitor blood pressure    4  Obesity with BMI around 33  Advised to lose weight is pre screen was negative in the hospital     Although she is related to atrial fibrillation, echo Doppler, mild valvular disease in the form of mitral valve all discussed with patient and patient's wife all their questions answered to their satisfaction  Patient has 2 chart they need to be merged  Thank you for your consultation  If you have any question please call me at [phone]    Counseling :  A description of the counseling  Goals and Barriers  Patient's ability to self care: Yes  Medication side effect reviewed with patient in detail and all their questions answered to their satisfaction        Primary Care Physician Requesting Consult:     Reason for Consult / Principal Problem:         HPI :     Goldy Quinonez is a 48y o  year old male who was admitted to [facility] over the weekend with palpitations and found to have AFib with rapid ventricular rate  Patient who has history of essential hypertension, obesity with BMI around 33, dyslipidemia which is treated was not feeling well he went to the work he was sweating as he did not like the how he felt he went to the emergency room after he was found to have atrial fibrillation with rapid ventricular rate  He was slowed with medication and underwent MICA guided cardioversion  MICA shows his EF is around 55% left atrium mildly dilated and his left atrial appendage was free from clot with good contractility  He has a mild-to-moderate MR  His lisinopril was changed to metoprolol  Today heart rate 50 beats per minute and he is taking 4 weeks of Eliquis  No nausea no vomiting no other issues at this time  While he was in the hospital he has a sleep screen done which shows he has no evidence of sleep apnea  Patient has 2 chart which need to be merged  Review of Systems   Constitutional: Negative for activity change, chills, diaphoresis, fever and unexpected weight change  HENT: Negative for congestion  Eyes: Negative for discharge and redness  Respiratory: Negative for cough, chest tightness, shortness of breath and wheezing  Cardiovascular: Negative  Negative for chest pain, palpitations and leg swelling  Gastrointestinal: Negative for abdominal pain, diarrhea and nausea  Endocrine: Negative  Genitourinary: Negative for decreased urine volume and urgency  Musculoskeletal: Negative  Negative for arthralgias, back pain and gait problem  Skin: Negative for rash and wound  Allergic/Immunologic: Negative  Neurological: Negative for dizziness, seizures, syncope, weakness, light-headedness and headaches  Hematological: Negative  Psychiatric/Behavioral: Negative for agitation and confusion  The patient is not nervous/anxious          Historical Information   Past Medical History:   Diagnosis Date    High cholesterol     Hypertension      Past Surgical History:   Procedure Laterality Date    HERNIA REPAIR       Social History     Substance and Sexual Activity   Alcohol Use Not Currently    Frequency: Never     Social History     Substance and Sexual Activity   Drug Use Never     Social History     Tobacco Use   Smoking Status Former Smoker   Smokeless Tobacco Never Used     Family History: No family history on file  Meds/Allergies     No Known Allergies    Current Outpatient Medications:     ELIQUIS 5 MG, Take 5 mg by mouth 2 (two) times a day, Disp: , Rfl:     metoprolol tartrate (LOPRESSOR) 50 mg tablet, Take 1 tablet (50 mg total) by mouth every 12 (twelve) hours, Disp: 60 tablet, Rfl: 3    rosuvastatin (CRESTOR) 40 MG tablet, Take 40 mg by mouth daily, Disp: , Rfl:     lisinopril (ZESTRIL) 10 mg tablet, Take 10 mg by mouth daily, Disp: , Rfl:     Vitals: Blood pressure 118/82, pulse (!) 51, temperature (!) 97 4 °F (36 3 °C), height 5' 9" (1 753 m), weight 103 kg (227 lb), SpO2 97 %  Body mass index is 33 52 kg/m²  Vitals:    07/08/20 1044   Weight: 103 kg (227 lb)     BP Readings from Last 3 Encounters:   07/08/20 118/82   06/26/20 138/77         Physical Exam    Neurologic:  Alert & oriented x 3, no new focal deficits, Not in any acute distress,  Constitutional:  Well developed, well nourished, non-toxic appearance   Eyes:  Pupil equal and reacting to light, conjunctiva normal, No JVP, No LNP   HENT:  Atraumatic, oropharynx moist, Neck- normal range of motion, no tenderness,  Neck supple   Respiratory:  Bilateral air entry, mostly clear to auscultation  Cardiovascular: S1-S2 regular with a 2/6 ejection systolic murmur and S4 is present  GI:  Soft, nondistended, normal bowel sounds, nontender, no hepatosplenomegaly appreciated  Musculoskeletal:  No edema, no tenderness, no deformities     Skin:  Well hydrated, no rash   Lymphatic:  No lymphadenopathy noted   Extremities:  No edema and distal pulses are present    Diagnostic Studies Review Cardio:  Echo and MICA reviewed  Patient has 2 charts  EF around 55% mild MR, no other significant valvular disease  The studies were done in June 2020  EKG:  Initial EKG in the hospital atrial fibrillation with heart rate around 140 beats per minute    EKG 07/08/2020 normal sinus rhythm heart rate 51 beats per minute    Dr Abdirashid Quezada MD [facility] [place]      "This note has been constructed using a voice recognition system  Therefore there may be syntax, spelling, and/or grammatical errors   Please call if you have any questions  "

## 2020-07-08 ENCOUNTER — OFFICE VISIT (OUTPATIENT)
Dept: CARDIOLOGY CLINIC | Facility: CLINIC | Age: 55
End: 2020-07-08
Payer: COMMERCIAL

## 2020-07-08 VITALS
HEIGHT: 69 IN | OXYGEN SATURATION: 97 % | TEMPERATURE: 97.4 F | SYSTOLIC BLOOD PRESSURE: 118 MMHG | DIASTOLIC BLOOD PRESSURE: 82 MMHG | HEART RATE: 51 BPM | WEIGHT: 227 LBS | BODY MASS INDEX: 33.62 KG/M2

## 2020-07-08 DIAGNOSIS — E66.9 OBESITY (BMI 30-39.9): ICD-10-CM

## 2020-07-08 DIAGNOSIS — E78.5 DYSLIPIDEMIA: ICD-10-CM

## 2020-07-08 DIAGNOSIS — I48.0 PAROXYSMAL ATRIAL FIBRILLATION (HCC): ICD-10-CM

## 2020-07-08 DIAGNOSIS — I10 ESSENTIAL HYPERTENSION: ICD-10-CM

## 2020-07-08 PROCEDURE — 99214 OFFICE O/P EST MOD 30 MIN: CPT | Performed by: INTERNAL MEDICINE

## 2020-07-08 PROCEDURE — 93000 ELECTROCARDIOGRAM COMPLETE: CPT | Performed by: INTERNAL MEDICINE

## 2020-07-08 RX ORDER — METOPROLOL TARTRATE 50 MG/1
50 TABLET, FILM COATED ORAL EVERY 12 HOURS
Qty: 60 TABLET | Refills: 3 | Status: SHIPPED | OUTPATIENT
Start: 2020-07-08 | End: 2021-01-21 | Stop reason: SDUPTHER

## 2020-07-08 RX ORDER — APIXABAN 5 MG/1
5 TABLET, FILM COATED ORAL 2 TIMES DAILY
COMMUNITY
Start: 2020-06-27 | End: 2020-11-13

## 2020-07-08 RX ORDER — METOPROLOL TARTRATE 50 MG/1
50 TABLET, FILM COATED ORAL EVERY 12 HOURS
COMMUNITY
Start: 2020-06-29 | End: 2020-07-08 | Stop reason: SDUPTHER

## 2020-07-27 ENCOUNTER — HOSPITAL ENCOUNTER (OUTPATIENT)
Dept: NON INVASIVE DIAGNOSTICS | Facility: HOSPITAL | Age: 55
Discharge: HOME | End: 2020-07-27
Attending: INTERNAL MEDICINE
Payer: COMMERCIAL

## 2020-07-27 DIAGNOSIS — E78.5 DYSLIPIDEMIA: ICD-10-CM

## 2020-07-27 DIAGNOSIS — E66.9 OBESITY (BMI 30-39.9): ICD-10-CM

## 2020-07-27 DIAGNOSIS — I10 ESSENTIAL HYPERTENSION: ICD-10-CM

## 2020-07-27 DIAGNOSIS — I48.0 PAROXYSMAL ATRIAL FIBRILLATION (HCC): ICD-10-CM

## 2020-07-27 LAB
CHEST PAIN STATEMENT: NORMAL
MAX DIASTOLIC BP: 90 MMHG
MAX HEART RATE: 162 BPM
MAX PREDICTED HEART RATE: 168 BPM
MAX. SYSTOLIC BP: 182 MMHG
PROTOCOL NAME: NORMAL
REASON FOR TERMINATION: NORMAL
TARGET HR FORMULA: NORMAL
TEST INDICATION: NORMAL
TIME IN EXERCISE PHASE: NORMAL

## 2020-07-27 PROCEDURE — 93018 CV STRESS TEST I&R ONLY: CPT | Performed by: INTERNAL MEDICINE

## 2020-07-27 PROCEDURE — 93017 CV STRESS TEST TRACING ONLY: CPT

## 2020-07-27 PROCEDURE — 93016 CV STRESS TEST SUPVJ ONLY: CPT | Performed by: INTERNAL MEDICINE

## 2020-07-28 ENCOUNTER — TELEPHONE (OUTPATIENT)
Dept: CARDIOLOGY CLINIC | Facility: CLINIC | Age: 55
End: 2020-07-28

## 2020-07-28 NOTE — TELEPHONE ENCOUNTER
----- Message from Candy Robles MD sent at 7/27/2020  4:37 PM EDT -----  Pt's Patient's stress test is normal    Patient can keep regular appointment  Please call patient with the result

## 2020-10-07 ENCOUNTER — OFFICE VISIT (OUTPATIENT)
Dept: URGENT CARE | Facility: CLINIC | Age: 55
End: 2020-10-07
Payer: COMMERCIAL

## 2020-10-07 VITALS
HEART RATE: 64 BPM | TEMPERATURE: 97 F | BODY MASS INDEX: 33.62 KG/M2 | WEIGHT: 227 LBS | RESPIRATION RATE: 16 BRPM | SYSTOLIC BLOOD PRESSURE: 139 MMHG | DIASTOLIC BLOOD PRESSURE: 92 MMHG | OXYGEN SATURATION: 98 % | HEIGHT: 69 IN

## 2020-10-07 DIAGNOSIS — H05.012 CELLULITIS OF LEFT ORBITAL REGION: Primary | ICD-10-CM

## 2020-10-07 PROCEDURE — 99213 OFFICE O/P EST LOW 20 MIN: CPT | Performed by: PHYSICIAN ASSISTANT

## 2020-10-07 RX ORDER — ASPIRIN 81 MG/1
81 TABLET, CHEWABLE ORAL DAILY
COMMUNITY
End: 2020-11-13

## 2020-10-07 RX ORDER — AMOXICILLIN AND CLAVULANATE POTASSIUM 875; 125 MG/1; MG/1
1 TABLET, FILM COATED ORAL EVERY 12 HOURS SCHEDULED
Qty: 20 TABLET | Refills: 0 | Status: SHIPPED | OUTPATIENT
Start: 2020-10-07 | End: 2020-10-17

## 2020-11-13 ENCOUNTER — OFFICE VISIT (OUTPATIENT)
Dept: CARDIOLOGY CLINIC | Facility: CLINIC | Age: 55
End: 2020-11-13
Payer: COMMERCIAL

## 2020-11-13 VITALS
HEIGHT: 69 IN | OXYGEN SATURATION: 97 % | BODY MASS INDEX: 33.62 KG/M2 | WEIGHT: 227 LBS | TEMPERATURE: 98.4 F | HEART RATE: 65 BPM | DIASTOLIC BLOOD PRESSURE: 82 MMHG | SYSTOLIC BLOOD PRESSURE: 130 MMHG

## 2020-11-13 DIAGNOSIS — E78.5 DYSLIPIDEMIA: ICD-10-CM

## 2020-11-13 DIAGNOSIS — E66.9 OBESITY (BMI 30-39.9): ICD-10-CM

## 2020-11-13 DIAGNOSIS — I10 ESSENTIAL HYPERTENSION: ICD-10-CM

## 2020-11-13 DIAGNOSIS — R42 DIZZINESS: ICD-10-CM

## 2020-11-13 DIAGNOSIS — I48.0 PAROXYSMAL ATRIAL FIBRILLATION (HCC): ICD-10-CM

## 2020-11-13 PROCEDURE — 93000 ELECTROCARDIOGRAM COMPLETE: CPT | Performed by: INTERNAL MEDICINE

## 2020-11-13 PROCEDURE — 99214 OFFICE O/P EST MOD 30 MIN: CPT | Performed by: INTERNAL MEDICINE

## 2020-11-13 RX ORDER — ASPIRIN 81 MG/1
162 TABLET, CHEWABLE ORAL DAILY
Qty: 100 TABLET | Refills: 2 | Status: SHIPPED | OUTPATIENT
Start: 2020-11-13

## 2021-01-20 DIAGNOSIS — I48.0 PAROXYSMAL ATRIAL FIBRILLATION (HCC): ICD-10-CM

## 2021-01-21 DIAGNOSIS — I48.0 PAROXYSMAL ATRIAL FIBRILLATION (HCC): ICD-10-CM

## 2021-01-21 RX ORDER — METOPROLOL TARTRATE 50 MG/1
50 TABLET, FILM COATED ORAL EVERY 12 HOURS
Qty: 180 TABLET | Refills: 3 | Status: SHIPPED | OUTPATIENT
Start: 2021-01-21 | End: 2021-12-01 | Stop reason: SDUPTHER

## 2021-01-21 RX ORDER — METOPROLOL TARTRATE 50 MG/1
TABLET, FILM COATED ORAL
Qty: 60 TABLET | Refills: 0 | OUTPATIENT
Start: 2021-01-21

## 2021-12-01 ENCOUNTER — OFFICE VISIT (OUTPATIENT)
Dept: CARDIOLOGY CLINIC | Facility: CLINIC | Age: 56
End: 2021-12-01
Payer: COMMERCIAL

## 2021-12-01 VITALS
WEIGHT: 242 LBS | HEART RATE: 69 BPM | TEMPERATURE: 98.5 F | OXYGEN SATURATION: 98 % | SYSTOLIC BLOOD PRESSURE: 130 MMHG | DIASTOLIC BLOOD PRESSURE: 78 MMHG | BODY MASS INDEX: 35.84 KG/M2 | HEIGHT: 69 IN

## 2021-12-01 DIAGNOSIS — I10 ESSENTIAL HYPERTENSION: ICD-10-CM

## 2021-12-01 DIAGNOSIS — E78.5 DYSLIPIDEMIA: ICD-10-CM

## 2021-12-01 DIAGNOSIS — E66.9 OBESITY (BMI 30-39.9): ICD-10-CM

## 2021-12-01 DIAGNOSIS — I48.0 PAROXYSMAL ATRIAL FIBRILLATION (HCC): ICD-10-CM

## 2021-12-01 PROCEDURE — 99214 OFFICE O/P EST MOD 30 MIN: CPT | Performed by: INTERNAL MEDICINE

## 2021-12-01 PROCEDURE — 93000 ELECTROCARDIOGRAM COMPLETE: CPT | Performed by: INTERNAL MEDICINE

## 2021-12-01 RX ORDER — METOPROLOL TARTRATE 50 MG/1
50 TABLET, FILM COATED ORAL EVERY 12 HOURS
Qty: 180 TABLET | Refills: 1 | Status: SHIPPED | OUTPATIENT
Start: 2021-12-01

## 2021-12-27 ENCOUNTER — TELEPHONE (OUTPATIENT)
Dept: CARDIOLOGY CLINIC | Facility: CLINIC | Age: 56
End: 2021-12-27

## 2022-02-04 ENCOUNTER — TELEPHONE (OUTPATIENT)
Dept: CARDIOLOGY CLINIC | Facility: CLINIC | Age: 57
End: 2022-02-04

## 2022-02-04 NOTE — TELEPHONE ENCOUNTER
2/4/2022  Received release of medical information form from [facility]   Faxed to medical records

## 2022-07-01 ENCOUNTER — OFFICE VISIT (OUTPATIENT)
Dept: CARDIOLOGY CLINIC | Facility: CLINIC | Age: 57
End: 2022-07-01
Payer: COMMERCIAL

## 2022-07-01 VITALS
HEIGHT: 69 IN | OXYGEN SATURATION: 97 % | DIASTOLIC BLOOD PRESSURE: 70 MMHG | WEIGHT: 253 LBS | BODY MASS INDEX: 37.47 KG/M2 | SYSTOLIC BLOOD PRESSURE: 110 MMHG | TEMPERATURE: 97 F | HEART RATE: 67 BPM

## 2022-07-01 DIAGNOSIS — E78.5 DYSLIPIDEMIA: ICD-10-CM

## 2022-07-01 DIAGNOSIS — I48.0 PAROXYSMAL ATRIAL FIBRILLATION (HCC): ICD-10-CM

## 2022-07-01 DIAGNOSIS — E66.9 OBESITY (BMI 30-39.9): ICD-10-CM

## 2022-07-01 DIAGNOSIS — I10 ESSENTIAL HYPERTENSION: ICD-10-CM

## 2022-07-01 PROCEDURE — 93000 ELECTROCARDIOGRAM COMPLETE: CPT | Performed by: INTERNAL MEDICINE

## 2022-07-01 PROCEDURE — 99214 OFFICE O/P EST MOD 30 MIN: CPT | Performed by: INTERNAL MEDICINE

## 2022-07-01 RX ORDER — ROSUVASTATIN CALCIUM 20 MG/1
20 TABLET, COATED ORAL DAILY
Qty: 90 TABLET | Refills: 1 | Status: SHIPPED | COMMUNITY
Start: 2022-07-01

## 2022-07-01 NOTE — PATIENT INSTRUCTIONS
Obesity   AMBULATORY CARE:   Obesity  means your body mass index (BMI) is greater than 30  Your healthcare provider will use your height and weight to measure your BMI  The risks of obesity include  many health problems, including injuries or physical disability  Diabetes (high blood sugar level)    High blood pressure or high cholesterol    Heart disease    Stroke    Gallbladder or liver disease    Cancer of the colon, breast, prostate, liver, or kidney    Sleep apnea    Arthritis or gout    Screening  is done to check for health conditions before you have signs or symptoms  If you are 28to 79years old, your blood sugar level may be checked every 3 years for signs of prediabetes or diabetes  Your healthcare provider will check your blood pressure at each visit  High blood pressure can lead to a stroke or other problems  Your provider may check for signs of heart disease, cancer, or other health problems  Seek care immediately if:   You have a severe headache, confusion, or difficulty speaking  You have weakness on one side of your body  You have chest pain, sweating, or shortness of breath  Call your doctor if:   You have symptoms of gallbladder or liver disease, such as pain in your upper abdomen  You have knee or hip pain and discomfort while walking  You have symptoms of diabetes, such as intense hunger and thirst, and frequent urination  You have symptoms of sleep apnea, such as snoring or daytime sleepiness  You have questions or concerns about your condition or care  Treatment for obesity  focuses on helping you lose weight to improve your health  Even a small decrease in BMI can reduce the risk for many health problems  Your healthcare provider will help you set a weight-loss goal   Lifestyle changes  are the first step in treating obesity  These include making healthy food choices and getting regular physical activity   Your healthcare provider may suggest a weight-loss program that involves coaching, education, and therapy  Medicine  may help you lose weight when it is used with a healthy foods and physical activity  Surgery  can help you lose weight if you are very obese and have other health problems  There are several types of weight-loss surgery  Ask your healthcare provider for more information  Tips for safe weight loss:   Set small, realistic goals  An example of a small goal is to walk for 20 minutes 5 days a week  Anther goal is to lose 5% of your body weight  Tell friends, family members, and coworkers about your goals  and ask for their support  Ask a friend to lose weight with you, or join a weight-loss support group  Identify foods or triggers that may cause you to overeat , and find ways to avoid them  Remove tempting high-calorie foods from your home and workplace  Place a bowl of fresh fruit on your kitchen counter  If stress causes you to eat, then find other ways to cope with stress  A counselor or therapist may be able to help you  Keep a diary to track what you eat and drink  Also write down how many minutes of physical activity you do each day  Weigh yourself once a week and record it in your diary  Eating changes: You will need to eat 500 to 1,000 fewer calories each day than you currently eat to lose 1 to 2 pounds a week  The following changes will help you cut calories:  Eat smaller portions  Use small plates, no larger than 9 inches in diameter  Fill your plate half full of fruits and vegetables  Measure your food using measuring cups until you know what a serving size looks like  Eat 3 meals and 1 or 2 snacks each day  Plan your meals in advance  Violet Tripp and eat at home most of the time  Eat slowly  Do not skip meals  Skipping meals can lead to overeating later in the day  This can make it harder for you to lose weight  Talk with a dietitian to help you make a meal plan and schedule that is right for you      Eat fruits and vegetables at every meal   They are low in calories and high in fiber, which makes you feel full  Do not add butter, margarine, or cream sauce to vegetables  Use herbs to season steamed vegetables  Eat less fat and fewer fried foods  Eat more baked or grilled chicken and fish  These protein sources are lower in calories and fat than red meat  Limit fast food  Dress your salads with olive oil and vinegar instead of bottled dressing  Limit the amount of sugar you eat  Do not drink sugary beverages  Limit alcohol  Activity changes:  Physical activity is good for your body in many ways  It helps you burn calories and build strong muscles  It decreases stress and depression, and improves your mood  It can also help you sleep better  Talk to your healthcare provider before you begin an exercise program   Exercise for at least 30 minutes 5 days a week  Start slowly  Set aside time each day for physical activity that you enjoy and that is convenient for you  It is best to do both weight training and an activity that increases your heart rate, such as walking, bicycling, or swimming  Find ways to be more active  Do yard work and housecleaning  Walk up the stairs instead of using elevators  Spend your leisure time going to events that require walking, such as outdoor festivals or fairs  This extra physical activity can help you lose weight and keep it off  Follow up with your doctor as directed: You may need to meet with a dietitian  Write down your questions so you remember to ask them during your visits  © Copyright [employer] 2022 Information is for End User's use only and may not be sold, redistributed or otherwise used for commercial purposes  All illustrations and images included in CareNotes® are the copyrighted property of A D A M , Inc  or Swati Rodgers  The above information is an  only   It is not intended as medical advice for individual conditions or treatments  Talk to your doctor, nurse or pharmacist before following any medical regimen to see if it is safe and effective for you  DASH Eating Plan   AMBULATORY CARE:   The DASH (Dietary Approaches to Stop Hypertension) Eating Plan  is designed to help prevent or lower high blood pressure  It can also help to lower LDL (bad) cholesterol and decrease your risk for heart disease  The plan is low in sodium, sugar, unhealthy fats, and total fat  It is high in potassium, calcium, magnesium, and fiber  These nutrients are added when you eat more fruits, vegetables, and whole grains  With the DASH eating plan, you need to eat a certain number of servings from each food group  This will help you get enough of certain nutrients and limit others  The amount of servings you should eat depends on how many calories you need  Your dietitian can help you create meal plans with the right number of servings for each food group  What you need to know about sodium:  Your dietitian will tell you how much sodium is safe for you to have each day  People with high blood pressure should have no more than 1,500 to 2,300 mg of sodium in a day  A teaspoon (tsp) of salt has 2,300 mg of sodium  This may seem like a difficult goal, but small changes to the foods you eat can make a big difference  Your healthcare provider or dietitian can help you create a meal plan that follows your sodium limit  Read food labels  Food labels can help you choose foods that are low in sodium  The amount of sodium is listed in milligrams (mg)  The % Daily Value (DV) column tells you how much of your daily needs are met by 1 serving of the food for each nutrient listed  Choose foods that have less than 5% of the DV of sodium  These foods are considered low in sodium  Foods that have 20% or more of the DV of sodium are considered high in sodium  Avoid foods that have more than 300 mg of sodium in each serving   Choose foods that say low-sodium, reduced-sodium, or no salt added on the food label  Limit added salt  Do not salt food at the table if you add salt when you cook  Use herbs and spices, such as onions, garlic, and salt-free seasonings to add flavor  Try lemon or lime juice or vinegar to add a tart flavor  Use hot peppers or a small amount of hot pepper sauce to add a spicy flavor  Limit foods high in added salt, such as the following:    Seasonings made with salt, such as garlic salt, celery salt, onion salt, seasoned salt, meat tenderizers, and monosodium glutamate (MSG)    Miso soup and canned or dried soup mixes    Regular soy sauce, barbecue sauce, teriyaki sauce, steak sauce, Worcestershire sauce, and most flavored vinegars    Snack foods, such as salted chips, popcorn, pretzels, pork rinds, salted crackers, and salted nuts    Frozen foods, such as dinners, entrees, vegetables with sauces, and breaded meats    Ask about salt substitutes  Ask your healthcare provider if you may use salt substitutes  Some salt substitutes have ingredients that can be harmful if you have certain health conditions  Choose foods carefully at restaurants  Meals from restaurants, especially fast food restaurants, are often high in sodium  Some restaurants have nutrition information that tells you the amount of sodium in their foods  Ask to have your food prepared with less, or no salt  What you need to know about fats:  Healthy fats include unsaturated fats and omega-3 fatty acids  Unhealthy fats include saturated fats and trans fats    Include healthy fats, such as the following:      Cooking oils, such as soybean, canola, olive, or sunflower    Fatty fish, such as salmon, tuna, mackerel, or sardines    Flaxseed oil or ground flaxseed    ½ cup of cooked beans, such as black beans, kidney beans, or benson beans    1½ ounces of low-sodium nuts, such as almonds or walnuts    Low-sugar, low-sodium peanut butter    Seeds such as ap seeds or sunflower seeds       Limit or do not have unhealthy fats, such as the following:      Foods that contain fat from animals, such as fatty meats, whole milk, butter, and cream    Shortening, stick margarine, palm oil, and coconut oil    Full-fat or creamy salad dressing    Creamy soup    Crackers, chips, and baked goods made with margarine or shortening    Foods that are fried in unhealthy fats    Gravy and sauces, such as Rob or cheese sauces    What you need to know about carbohydrates (carbs): All carbs break down into sugar  Complex carbs contain more fiber than simple carbs  This means complex carbs go into the bloodstream more slowly and cause less of a blood sugar spike  Try to include more complex carbs and fewer simple carbs  Include complex carbs, such as the followin slice of whole-grain bread    1 ounce of dry cereal that does not contain added sugar    ½ cup of cooked oatmeal    2 ounces of cooked whole-grain pasta    ½ cup of cooked brown rice    Limit or do not have simple carbs, such as the following:      [employer], such as doughnuts, pastries, and cookies    Mixes for cornbread and biscuits    White rice and pasta mixes, such as boxed macaroni and cheese    Instant and cold cereals that contain sugar    Jelly, jam, and ice cream that contain sugar    Condiments such as ketchup    Drinks high in sugar, such as soft drinks, lemonade, and fruit juice    What you need to know about vegetables and fruits:  Vegetables and fruits can be fresh, frozen, or canned  If possible, try to choose low-sodium canned options    Include a variety of vegetables and fruits, such as the followin medium apple, pear, or peach (about ½ cup chopped)    ½ small banana    ½ cup berries, such as blueberries, strawberries, or blackberries    1 cup of raw leafy greens, such as lettuce, spinach, kale, or yvette greens    ½ cup of frozen or canned (no added salt) vegetables, such as green beans    ½ cup of fresh, frozen, or canned fruit (canned in light syrup or fruit juice)    ½ cup of vegetable or fruit juice    Limit or do not have vegetables and fruits made in the following ways:      Frozen fruit such as cherries that have added sugar    Fruit in cream or butter sauce    Canned vegetables that are high in sodium    Sauerkraut, pickled vegetables, and other foods prepared in brine    Fried vegetables or vegetables in butter or high-fat sauces    What you need to know about protein foods: Include lean or low-fat protein foods, such as the following:      Poultry (chicken, turkey) with no skin    Fish (especially fatty fish, such as salmon, fresh tuna, or mackerel)    Lean beef and pork (loin, round, extra lean hamburger)    Egg whites and egg substitutes    1 cup of nonfat (skim) or 1% milk    1½ ounces of fat-free or low-fat cheese    6 ounces of nonfat or low-fat yogurt    Limit or do not have high-fat protein foods, such as the following:      Smoked or cured meat, such as corned beef, moore, ham, hot dogs, and sausage    Canned beans and canned meats or spreads, such as potted meats, sardines, anchovies, and imitation seafood    Deli or lunch meats, such as bologna, ham, turkey, and roast beef    High-fat meat (T-bone steak, regular hamburger, and ribs)    Whole eggs and egg yolks    Whole milk, 2% milk, and cream    Regular cheese and processed cheese    Other guidelines to follow:   Maintain a healthy weight  Your risk for heart disease is higher if you are overweight  Your healthcare provider may suggest that you lose weight if you are overweight  You can lose weight by eating fewer calories and foods that have added sugars and fat  The DASH meal plan can help you do this  Decrease calories by eating smaller portions at each meal and fewer snacks  Ask your healthcare provider for more information about how to lose weight  Exercise regularly  Regular exercise can help you reach or maintain a healthy weight  Regular exercise can also help decrease your blood pressure and improve your cholesterol levels  Get 30 minutes or more of moderate exercise each day of the week  To lose weight, get at least 60 minutes of exercise  Talk to your healthcare provider about the best exercise program for you  Limit alcohol  Women should limit alcohol to 1 drink a day  Men should limit alcohol to 2 drinks a day  A drink of alcohol is 12 ounces of beer, 5 ounces of wine, or 1½ ounces of liquor  For more information:   [employer] [facility] and [address]  [address] [address]  [place] [address]  Phone: [phone]  Web Address: [facility] no    © [address] 2022 Information is for End User's use only and may not be sold, redistributed or otherwise used for commercial purposes  All illustrations and images included in CareNotes® are the copyrighted property of A D A M , Inc  or [address] [place] [address]  The above information is an  only  It is not intended as medical advice for individual conditions or treatments  Talk to your doctor, nurse or pharmacist before following any medical regimen to see if it is safe and effective for you

## 2022-07-01 NOTE — PROGRESS NOTES
Progress note - Cardiology Office   [facility]  Linh Ballesteros 49 y o  male MRN: [id number]  : 1965  Unit/Bed#:  Encounter: [id number]      Assessment:     1  Paroxysmal atrial fibrillation (HCC)    2  Essential hypertension    3  Dyslipidemia    4  Obesity (BMI 30-39  9)        Discussion summary and Plan:  1  Paroxysmal atrial fibrillation  Patient had MICA cardioversion in  no evidence of atrial fibrillation since then  He Nivia Vasc score is low he is on aspirin 162 mg daily  He takes metoprolol 50 in the morning and 25 in the evening  He did not do the Holter monitor we have ordered  But he has no palpitations  Continue aspirin his Nivia Vasc score is low  And he can be managed with aspirin      2  Dyslipidemia  Continue Crestor he gets his blood test with his primary care doctor  Last time his cholesterol medication was decreased as per patient to 20 mg he will get blood test and send us a copy    3  Essential hypertension  Blood pressure is well controlled with metoprolol  Continue same medication    4  Obesity with BMI around 37  He has gained some weight discussed with him various issues related to weight gain    Issues related to atrial fibrillation, chance of reoccurrence, occult atrial fibrillation all discussed with patient's he agree with the plan  Thank you for your consultation  If you have any question please call me at [phone]    Counseling :  A description of the counseling  Goals and Barriers  Patient's ability to self care: Yes  Medication side effect reviewed with patient in detail and all their questions answered to their satisfaction  Primary Care Physician:  [facility]      HPI :     Linh Ballesteros is a 49y o  year old male who was admitted to [facility] over the weekend with palpitations and found to have AFib with rapid ventricular rate    Patient who has history of essential hypertension, obesity with BMI around 35, dyslipidemia which is treated was not feeling well he went to the work he was sweating as he did not like the how he felt he went to the emergency room after he was found to have atrial fibrillation with rapid ventricular rate  He was slowed with medication and underwent MICA guided cardioversion  MICA shows his EF is around 55% left atrium mildly dilated and his left atrial appendage was free from clot with good contractility  He has a mild-to-moderate MR  His lisinopril was changed to metoprolol  Today heart rate 50 beats per minute and he is taking 4 weeks of Eliquis  No nausea no vomiting no other issues at this time  While he was in the hospital he has a sleep screen done which shows he has no evidence of sleep apnea  Patient has 2 chart which need to be merged  12/01/2021  Above reviewed patient came for follow-up he is doing well since his last admission to the hospital no evidence of any reoccurrence of atrial fibrillation  He is doing well he feels well he is taking metoprolol 50 in the morning and 25 in the evening  His heart rate is 69 beats per minute today  His BMI is 35 he has gained weight since last visit  He is compliant with his aspirin and cholesterol medications  He gets his blood work mostly done with his primary care doctor  No nausea no vomiting no fever no chills no PND no orthopnea no other cardiovascular issues  He had a sleep screen done which shows no evidence of sleep apnea but advised against gaining weight  07/01/2022  Above reviewed  Patient came for follow-up  He has medical history significant for obesity with BMI around 37 as he has gained more weight, paroxysmal atrial fibrillation currently in sinus, dyslipidemia who came for follow-up  He was admitted to hospital with AFib with rapid ventricular rate  He is feeling well with taking metoprolol 50 in the morning and 25 in the evening    Today his heart rate is 67 beats per minute blood pressure is acceptable  His last blood work in [employer] system was in 2020  He mostly gets blood test with his primary care doctor  He is doing well he has no nausea no vomiting no fever no chills  He follows up with his primary care team who does blood test no other issues at this time  Review of Systems   Constitutional: Negative for activity change, chills, diaphoresis, fever and unexpected weight change  HENT: Negative for congestion  Eyes: Negative for discharge and redness  Respiratory: Negative for cough, chest tightness, shortness of breath and wheezing  Cardiovascular: Negative  Negative for chest pain, palpitations and leg swelling  Gastrointestinal: Negative for abdominal pain, diarrhea and nausea  Endocrine: Negative  Genitourinary: Negative for decreased urine volume and urgency  Musculoskeletal: Negative  Negative for arthralgias, back pain and gait problem  Skin: Negative for rash and wound  Allergic/Immunologic: Negative  Neurological: Negative for dizziness, seizures, syncope, weakness, light-headedness and headaches  Hematological: Negative  Psychiatric/Behavioral: Negative for agitation and confusion  The patient is not nervous/anxious          Historical Information   Past Medical History:   Diagnosis Date    High cholesterol     Hyperlipidemia     Hypertension      Past Surgical History:   Procedure Laterality Date    HERNIA REPAIR      KNEE SURGERY      TONSILLECTOMY       Social History     Substance and Sexual Activity   Alcohol Use Not Currently    Comment: soc     Social History     Substance and Sexual Activity   Drug Use Never     Social History     Tobacco Use   Smoking Status Former Smoker    Packs/day: 1 00    Years: 6 00    Pack years: 6 00    Types: Cigarettes    Quit date: 2000    Years since quittin 0   Smokeless Tobacco Never Used   Tobacco Comment    15 years ago     Family History:   Family History   Problem Relation Age of Onset    Heart disease Father        Meds/Allergies     No Known Allergies    Current Outpatient Medications:     aspirin 81 mg chewable tablet, Chew 2 tablets (162 mg total) daily, Disp: 100 tablet, Rfl: 2    metoprolol tartrate (LOPRESSOR) 50 mg tablet, Take 1 tablet (50 mg total) by mouth every 12 (twelve) hours 50mg am 25 mg pm , Disp: 180 tablet, Rfl: 1    multivitamin (THERAGRAN) TABS, Take 1 tablet by mouth daily, Disp: , Rfl:     rosuvastatin (CRESTOR) 20 MG tablet, Take 1 tablet (20 mg total) by mouth daily, Disp: 90 tablet, Rfl: 1    Vitals: Blood pressure 110/70, pulse 67, temperature (!) 97 °F (36 1 °C), height 5' 9" (1 753 m), weight 115 kg (253 lb), SpO2 97 %  ?  Body mass index is 37 36 kg/m²  Vitals:    07/01/22 1511   Weight: 115 kg (253 lb)     BP Readings from Last 3 Encounters:   07/01/22 110/70   12/01/21 130/78   11/13/20 130/82         Physical Exam  Constitutional:       General: He is not in acute distress  Appearance: He is well-developed  He is not diaphoretic  Neck:      Thyroid: No thyromegaly  Vascular: No JVD  Cardiovascular:      Rate and Rhythm: Normal rate and regular rhythm  Pulses: Normal pulses  Heart sounds: Normal heart sounds  Pulmonary:      Effort: Pulmonary effort is normal  No respiratory distress  Breath sounds: Normal breath sounds  No wheezing or rales  Abdominal:      General: There is no distension  Palpations: Abdomen is soft  Tenderness: There is no abdominal tenderness  There is no rebound  Musculoskeletal:         General: No tenderness  Normal range of motion  Cervical back: Normal range of motion and neck supple  Comments: No lower extremity edema   Skin:     General: Skin is warm and dry  Neurological:      Mental Status: He is alert and oriented to person, place, and time     Psychiatric:         Behavior: Behavior normal          Judgment: Judgment normal            Diagnostic Studies Review Cardio:  Echo and MICA reviewed  Patient has 2 charts  EF around 55% mild MR, no other significant valvular disease  The studies were done in June 2020  Exercise stress test done in July 2020 shows normal exercise stress test    EKG:  Initial EKG in the hospital atrial fibrillation with heart rate around 140 beats per minute    EKG 07/08/2020 normal sinus rhythm heart rate 51 beats per minute    EKG 11/30/2020 shows normal sinus rhythm heart rate 65 beats per minute  EKG 12 lead done on 12/01/2021 shows normal sinus rhythm heart rate 69 beats per minute  Twelve lead EKG done on 07/01/2022 normal sinus rhythm heart rate 67 beats per minute it is a normal EKG  Dr Angel Philip MD [facility]      "This note has been constructed using a voice recognition system  Therefore there may be syntax, spelling, and/or grammatical errors   Please call if you have any questions  "

## 2022-12-28 DIAGNOSIS — I48.0 PAROXYSMAL ATRIAL FIBRILLATION (HCC): ICD-10-CM

## 2022-12-28 RX ORDER — METOPROLOL TARTRATE 50 MG/1
50 TABLET, FILM COATED ORAL EVERY 12 HOURS
Qty: 180 TABLET | Refills: 1 | Status: SHIPPED | OUTPATIENT
Start: 2022-12-28

## 2022-12-30 ENCOUNTER — TELEPHONE (OUTPATIENT)
Dept: CARDIOLOGY CLINIC | Facility: CLINIC | Age: 57
End: 2022-12-30

## 2022-12-30 NOTE — TELEPHONE ENCOUNTER
----- Message from Bruna Irving MD sent at 12/29/2022 12:55 PM EST -----  Regarding: Metoprolol dose  Contact this patient as I received a prescription clarification from the pharmacy regarding his current dose of metoprolol  The dose as per his last note was 50 mg in the morning and 25 mg in the evening  Check with the patient to see whether that is what he is actually taking      Dr Nusrat Stephens

## 2023-03-04 ENCOUNTER — APPOINTMENT (EMERGENCY)
Dept: RADIOLOGY | Facility: HOSPITAL | Age: 58
End: 2023-03-04

## 2023-03-04 ENCOUNTER — HOSPITAL ENCOUNTER (EMERGENCY)
Facility: HOSPITAL | Age: 58
Discharge: HOME | End: 2023-03-04
Attending: STUDENT IN AN ORGANIZED HEALTH CARE EDUCATION/TRAINING PROGRAM | Admitting: STUDENT IN AN ORGANIZED HEALTH CARE EDUCATION/TRAINING PROGRAM

## 2023-03-04 VITALS
RESPIRATION RATE: 16 BRPM | HEIGHT: 69 IN | HEART RATE: 72 BPM | TEMPERATURE: 98 F | BODY MASS INDEX: 36.38 KG/M2 | OXYGEN SATURATION: 98 % | DIASTOLIC BLOOD PRESSURE: 78 MMHG | SYSTOLIC BLOOD PRESSURE: 127 MMHG | WEIGHT: 245.59 LBS

## 2023-03-04 DIAGNOSIS — S81.812A LEG LACERATION, LEFT, INITIAL ENCOUNTER: ICD-10-CM
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
## 2023-03-04 LAB
ABO GROUP BLD: NORMAL
ABO GROUP BLD: NORMAL
ALBUMIN SERPL BCP-MCNC: 4 G/DL (ref 3.5–5)
ALBUMIN SERPL BCP-MCNC: 4 G/DL (ref 3.5–5)
ALP SERPL-CCNC: 74 U/L (ref 46–116)
ALP SERPL-CCNC: 74 U/L (ref 46–116)
ALT SERPL W P-5'-P-CCNC: 32 U/L (ref 12–78)
ALT SERPL W P-5'-P-CCNC: 32 U/L (ref 12–78)
ANION GAP SERPL CALCULATED.3IONS-SCNC: 5 MMOL/L (ref 4–13)
ANION GAP SERPL CALCULATED.3IONS-SCNC: 5 MMOL/L (ref 4–13)
AST SERPL W P-5'-P-CCNC: 23 U/L (ref 5–45)
AST SERPL W P-5'-P-CCNC: 23 U/L (ref 5–45)
ATRIAL RATE: 65 BPM
ATRIAL RATE: 65 BPM
BASE EXCESS BLDA CALC-SCNC: 1 MMOL/L (ref -2–3)
BASE EXCESS BLDA CALC-SCNC: 1 MMOL/L (ref -2–3)
BASOPHILS # BLD AUTO: 0.07 THOUSANDS/ÂΜL (ref 0–0.1)
BASOPHILS # BLD AUTO: 0.07 THOUSANDS/ÂΜL (ref 0–0.1)
BASOPHILS NFR BLD AUTO: 1 % (ref 0–1)
BASOPHILS NFR BLD AUTO: 1 % (ref 0–1)
BILIRUB SERPL-MCNC: 1.02 MG/DL (ref 0.2–1)
BILIRUB SERPL-MCNC: 1.02 MG/DL (ref 0.2–1)
BLD GP AB SCN SERPL QL: NEGATIVE
BLD GP AB SCN SERPL QL: NEGATIVE
BUN SERPL-MCNC: 16 MG/DL (ref 5–25)
BUN SERPL-MCNC: 16 MG/DL (ref 5–25)
CA-I BLD-SCNC: 1.25 MMOL/L (ref 1.12–1.32)
CA-I BLD-SCNC: 1.25 MMOL/L (ref 1.12–1.32)
CALCIUM SERPL-MCNC: 8.9 MG/DL (ref 8.3–10.1)
CALCIUM SERPL-MCNC: 8.9 MG/DL (ref 8.3–10.1)
CHLORIDE SERPL-SCNC: 107 MMOL/L (ref 96–108)
CHLORIDE SERPL-SCNC: 107 MMOL/L (ref 96–108)
CO2 SERPL-SCNC: 24 MMOL/L (ref 21–32)
CO2 SERPL-SCNC: 24 MMOL/L (ref 21–32)
CREAT SERPL-MCNC: 1 MG/DL (ref 0.6–1.3)
CREAT SERPL-MCNC: 1 MG/DL (ref 0.6–1.3)
DME PARACHUTE DELIVERY DATE REQUESTED: NORMAL
DME PARACHUTE ITEM DESCRIPTION: NORMAL
DME PARACHUTE ORDER STATUS: NORMAL
DME PARACHUTE SUPPLIER NAME: NORMAL
DME PARACHUTE SUPPLIER PHONE: NORMAL
EOSINOPHIL # BLD AUTO: 0.32 THOUSAND/ÂΜL (ref 0–0.61)
EOSINOPHIL # BLD AUTO: 0.32 THOUSAND/ÂΜL (ref 0–0.61)
EOSINOPHIL NFR BLD AUTO: 4 % (ref 0–6)
EOSINOPHIL NFR BLD AUTO: 4 % (ref 0–6)
ERYTHROCYTE [DISTWIDTH] IN BLOOD BY AUTOMATED COUNT: 12.7 % (ref 11.6–15.1)
ERYTHROCYTE [DISTWIDTH] IN BLOOD BY AUTOMATED COUNT: 12.7 % (ref 11.6–15.1)
GFR SERPL CREATININE-BSD FRML MDRD: 84 ML/MIN/1.73SQ M
GFR SERPL CREATININE-BSD FRML MDRD: 84 ML/MIN/1.73SQ M
GLUCOSE SERPL-MCNC: 144 MG/DL (ref 65–140)
GLUCOSE SERPL-MCNC: 144 MG/DL (ref 65–140)
GLUCOSE SERPL-MCNC: 146 MG/DL (ref 65–140)
GLUCOSE SERPL-MCNC: 146 MG/DL (ref 65–140)
HCO3 BLDA-SCNC: 27.7 MMOL/L (ref 24–30)
HCO3 BLDA-SCNC: 27.7 MMOL/L (ref 24–30)
HCT VFR BLD AUTO: 42.1 % (ref 36.5–49.3)
HCT VFR BLD AUTO: 42.1 % (ref 36.5–49.3)
HCT VFR BLD CALC: 42 % (ref 36.5–49.3)
HCT VFR BLD CALC: 42 % (ref 36.5–49.3)
HGB BLD-MCNC: 14.3 G/DL (ref 12–17)
HGB BLD-MCNC: 14.3 G/DL (ref 12–17)
HGB BLDA-MCNC: 14.3 G/DL (ref 12–17)
HGB BLDA-MCNC: 14.3 G/DL (ref 12–17)
HOLD SPECIMEN: NORMAL
IMM GRANULOCYTES # BLD AUTO: 0.04 THOUSAND/UL (ref 0–0.2)
IMM GRANULOCYTES # BLD AUTO: 0.04 THOUSAND/UL (ref 0–0.2)
IMM GRANULOCYTES NFR BLD AUTO: 1 % (ref 0–2)
IMM GRANULOCYTES NFR BLD AUTO: 1 % (ref 0–2)
LYMPHOCYTES # BLD AUTO: 2.43 THOUSANDS/ÂΜL (ref 0.6–4.47)
LYMPHOCYTES # BLD AUTO: 2.43 THOUSANDS/ÂΜL (ref 0.6–4.47)
LYMPHOCYTES NFR BLD AUTO: 29 % (ref 14–44)
LYMPHOCYTES NFR BLD AUTO: 29 % (ref 14–44)
MCH RBC QN AUTO: 29.7 PG (ref 26.8–34.3)
MCH RBC QN AUTO: 29.7 PG (ref 26.8–34.3)
MCHC RBC AUTO-ENTMCNC: 34 G/DL (ref 31.4–37.4)
MCHC RBC AUTO-ENTMCNC: 34 G/DL (ref 31.4–37.4)
MCV RBC AUTO: 87 FL (ref 82–98)
MCV RBC AUTO: 87 FL (ref 82–98)
MONOCYTES # BLD AUTO: 0.83 THOUSAND/ÂΜL (ref 0.17–1.22)
MONOCYTES # BLD AUTO: 0.83 THOUSAND/ÂΜL (ref 0.17–1.22)
MONOCYTES NFR BLD AUTO: 10 % (ref 4–12)
MONOCYTES NFR BLD AUTO: 10 % (ref 4–12)
NEUTROPHILS # BLD AUTO: 4.78 THOUSANDS/ÂΜL (ref 1.85–7.62)
NEUTROPHILS # BLD AUTO: 4.78 THOUSANDS/ÂΜL (ref 1.85–7.62)
NEUTS SEG NFR BLD AUTO: 55 % (ref 43–75)
NEUTS SEG NFR BLD AUTO: 55 % (ref 43–75)
NRBC BLD AUTO-RTO: 0 /100 WBCS
NRBC BLD AUTO-RTO: 0 /100 WBCS
P AXIS: 65 DEGREES
P AXIS: 65 DEGREES
PCO2 BLD: 29 MMOL/L (ref 21–32)
PCO2 BLD: 29 MMOL/L (ref 21–32)
PCO2 BLD: 50 MM HG (ref 42–50)
PCO2 BLD: 50 MM HG (ref 42–50)
PH BLD: 7.35 [PH] (ref 7.3–7.4)
PH BLD: 7.35 [PH] (ref 7.3–7.4)
PLATELET # BLD AUTO: 202 THOUSANDS/UL (ref 149–390)
PLATELET # BLD AUTO: 202 THOUSANDS/UL (ref 149–390)
PMV BLD AUTO: 10.8 FL (ref 8.9–12.7)
PMV BLD AUTO: 10.8 FL (ref 8.9–12.7)
PO2 BLD: 30 MM HG (ref 35–45)
PO2 BLD: 30 MM HG (ref 35–45)
POTASSIUM BLD-SCNC: 4.3 MMOL/L (ref 3.5–5.3)
POTASSIUM BLD-SCNC: 4.3 MMOL/L (ref 3.5–5.3)
POTASSIUM SERPL-SCNC: 4.1 MMOL/L (ref 3.5–5.3)
POTASSIUM SERPL-SCNC: 4.1 MMOL/L (ref 3.5–5.3)
PR INTERVAL: 162 MS
PR INTERVAL: 162 MS
PROT SERPL-MCNC: 7.3 G/DL (ref 6.4–8.4)
PROT SERPL-MCNC: 7.3 G/DL (ref 6.4–8.4)
QRS AXIS: 59 DEGREES
QRS AXIS: 59 DEGREES
QRSD INTERVAL: 92 MS
QRSD INTERVAL: 92 MS
QT INTERVAL: 382 MS
QT INTERVAL: 382 MS
QTC INTERVAL: 397 MS
QTC INTERVAL: 397 MS
RBC # BLD AUTO: 4.82 MILLION/UL (ref 3.88–5.62)
RBC # BLD AUTO: 4.82 MILLION/UL (ref 3.88–5.62)
RH BLD: POSITIVE
RH BLD: POSITIVE
SAO2 % BLD FROM PO2: 54 % (ref 60–85)
SAO2 % BLD FROM PO2: 54 % (ref 60–85)
SODIUM BLD-SCNC: 139 MMOL/L (ref 136–145)
SODIUM BLD-SCNC: 139 MMOL/L (ref 136–145)
SODIUM SERPL-SCNC: 136 MMOL/L (ref 135–147)
SODIUM SERPL-SCNC: 136 MMOL/L (ref 135–147)
SPECIMEN EXPIRATION DATE: NORMAL
SPECIMEN EXPIRATION DATE: NORMAL
SPECIMEN SOURCE: ABNORMAL
SPECIMEN SOURCE: ABNORMAL
T WAVE AXIS: 2 DEGREES
T WAVE AXIS: 2 DEGREES
VENTRICULAR RATE: 65 BPM
VENTRICULAR RATE: 65 BPM
WBC # BLD AUTO: 8.47 THOUSAND/UL (ref 4.31–10.16)
WBC # BLD AUTO: 8.47 THOUSAND/UL (ref 4.31–10.16)

## 2023-03-04 RX ORDER — HYDROMORPHONE HCL/PF 1 MG/ML
SYRINGE (ML) INJECTION
Status: COMPLETED
Start: 2023-03-04 | End: 2023-03-04

## 2023-03-04 RX ORDER — SENNOSIDES 8.6 MG
650 CAPSULE ORAL EVERY 8 HOURS PRN
Qty: 30 TABLET | Refills: 0 | Status: SHIPPED | OUTPATIENT
Start: 2023-03-04 | End: 2023-04-23

## 2023-03-04 RX ORDER — LIDOCAINE HYDROCHLORIDE 10 MG/ML
20 INJECTION, SOLUTION EPIDURAL; INFILTRATION; INTRACAUDAL; PERINEURAL ONCE
Status: COMPLETED | OUTPATIENT
Start: 2023-03-04 | End: 2023-03-04

## 2023-03-04 RX ORDER — HYDROMORPHONE HCL/PF 1 MG/ML
1 SYRINGE (ML) INJECTION ONCE
Status: COMPLETED | OUTPATIENT
Start: 2023-03-04 | End: 2023-03-04

## 2023-03-04 RX ADMIN — HYDROMORPHONE HYDROCHLORIDE 1 MG: 1 INJECTION, SOLUTION INTRAMUSCULAR; INTRAVENOUS; SUBCUTANEOUS at 14:47

## 2023-03-04 RX ADMIN — LIDOCAINE HYDROCHLORIDE 20 ML: 10 INJECTION, SOLUTION EPIDURAL; INFILTRATION; INTRACAUDAL; PERINEURAL at 13:14

## 2023-03-04 RX ADMIN — Medication 1 MG: at 14:31

## 2023-03-04 RX ADMIN — HYDROMORPHONE HYDROCHLORIDE 1 MG: 1 INJECTION, SOLUTION INTRAMUSCULAR; INTRAVENOUS; SUBCUTANEOUS at 14:31

## 2023-03-04 RX ADMIN — IOHEXOL 100 ML: 350 INJECTION, SOLUTION INTRAVENOUS at 12:54
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
## 2023-03-04 NOTE — ED PROCEDURE NOTE
Procedure  Laceration repair    Date/Time: 3/4/2023 4:24 PM  Performed by: Cookie Shelley DO  Authorized by: Cookie Shelley DO   Consent: Verbal consent obtained  Consent given by: patient  Patient understanding: patient states understanding of the procedure being performed  Patient consent: the patient's understanding of the procedure matches consent given  Procedure consent: procedure consent matches procedure scheduled  Relevant documents: relevant documents present and verified  Test results: test results available and properly labeled  Site marked: the operative site was marked  Radiology Images displayed and confirmed  If images not available, report reviewed: imaging studies available  Patient identity confirmed: verbally with patient  Body area: lower extremity  Location details: left lower leg  Laceration length: 20 cm  Contamination: The wound is contaminated  Tendon involvement: none  Nerve involvement: none  Vascular damage: no    Anesthesia:  Local Anesthetic: lidocaine 1% with epinephrine  Anesthetic total: 15 mL    Sedation:  Patient sedated: no      Wound Dehiscence:  Superficial Wound Dehiscence: simple closure      Procedure Details:  Preparation: Patient was prepped and draped in the usual sterile fashion    Irrigation solution: saline  Irrigation method: syringe  Amount of cleaning: extensive  Debridement: none  Degree of undermining: none  Skin closure: 6-0 Prolene  Mucous membrane closure: 3-0 Chromic gut  Number of sutures: 40  Technique: simple  Approximation: close  Approximation difficulty: complex  Dressing: 4x4 sterile gauze and gauze packing  Patient tolerance: patient tolerated the procedure well with no immediate complications  Cleaning details: orlando Shelley DO  03/04/23 1626

## 2023-03-04 NOTE — DISCHARGE INSTRUCTIONS
Thank you for coming to the ER today  Please follow up with your primary care doctor in 1-2 days to be re-evaluated  If at any point you experience any new or worsening symptoms do not hesitate to come back to the hospital or to call the Trauma office for further evaluation  Thank you and hope you have a great rest of your day
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
## 2023-03-04 NOTE — ED NOTES
DRSG to left lower leg saturated with blood    Dr Hever Rutledge [facility]     Linnette Villar, RN  03/04/23 8109

## 2023-03-04 NOTE — Clinical Note
Karolyn Merlin was seen and treated in our emergency department on 3/4/2023  Diagnosis:     Geraldo Yang    He may return on this date: 03/06/2023         If you have any questions or concerns, please don't hesitate to call        Xavi Cervantes, DO    ______________________________           _______________          _______________  Hospital Representative                              Date                                Time

## 2023-03-04 NOTE — ASSESSMENT & PLAN NOTE
- Multiple lacerations to left lower extremity  - Xray negative for acute fracture  - No ancef indicated at this time  - Extensive wash out as well as closure  - Tetanus UTD
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
## 2023-03-04 NOTE — Clinical Note
Dre Abhi was seen and treated in our emergency department on 3/4/2023  Diagnosis:     Susan Zamarripa    He may return on this date: 03/06/2023         If you have any questions or concerns, please don't hesitate to call        Lenora Williamson DO    ______________________________           _______________          _______________  Hospital Representative                              Date                                Time
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
## 2023-03-06 LAB
DME PARACHUTE DELIVERY DATE ACTUAL: NORMAL
DME PARACHUTE DELIVERY DATE REQUESTED: NORMAL
DME PARACHUTE ITEM DESCRIPTION: NORMAL
DME PARACHUTE ORDER STATUS: NORMAL
DME PARACHUTE SUPPLIER NAME: NORMAL
DME PARACHUTE SUPPLIER PHONE: NORMAL

## 2023-03-14 ENCOUNTER — OFFICE VISIT (OUTPATIENT)
Dept: SURGERY | Facility: CLINIC | Age: 58
End: 2023-03-14

## 2023-03-14 VITALS — HEIGHT: 69 IN | WEIGHT: 246.1 LBS | TEMPERATURE: 97.4 F | BODY MASS INDEX: 36.45 KG/M2

## 2023-03-14 DIAGNOSIS — S81.812D: Primary | ICD-10-CM

## 2023-03-14 NOTE — PROGRESS NOTES
Office Visit - General Surgery  Anoop Chisholm MRN: [id number]  Encounter: [id number]    Assessment and Plan  Problem List Items Addressed This Visit        Musculoskeletal and Integument    Lacerations of multiple sites of left leg - Primary     - Laceration of left lower extremity repaired in the ED  - Here for suture removal  - All sutures removed at today's visit  - Patient tolerated well with no immediate complications  - Wound is well approximated with underlying hematoma  - Hematoma decreased in size  - No evidence of infection at visit   - recommended to continue to keep area clean  - Sutures replaced with steri strips  - May call back with questions or concerns          Disposition: D/C from trauma clinic  Patient here for wound check and suture removal      Chief Complaint:  Anoop Chisholm is a 56 y o  male who presents for Follow-up (F/u suture removal )    Subjective  Patient offering no new complaints today on presentation  States he is doing well  Reports no new issues with wound       Past Medical History:   Diagnosis Date   • High cholesterol    • Hyperlipidemia    • Hypertension        Past Surgical History:   Procedure Laterality Date   • HERNIA REPAIR     • KNEE SURGERY     • TONSILLECTOMY         Family History   Problem Relation Age of Onset   • Heart disease Father        Social History     Tobacco Use   • Smoking status: Former     Packs/day: 1 00     Years: 6 00     Pack years: 6 00     Types: Cigarettes     Quit date: 2000     Years since quittin 7   • Smokeless tobacco: Never   • Tobacco comments:     15 years ago   Vaping Use   • Vaping Use: Never used   Substance Use Topics   • Alcohol use: Not Currently     Comment: soc   • Drug use: Never        Medications  Current Outpatient Medications on File Prior to Visit   Medication Sig Dispense Refill   • acetaminophen (TYLENOL) 650 mg CR tablet Take 1 tablet (650 mg total) by mouth every 8 (eight) hours as needed for mild pain 30 tablet 0   • aspirin 81 mg chewable tablet Chew 2 tablets (162 mg total) daily 100 tablet 2   • metoprolol tartrate (LOPRESSOR) 50 mg tablet Take 1 tablet (50 mg total) by mouth every 12 (twelve) hours 50mg am 25 mg pm  180 tablet 1   • multivitamin (THERAGRAN) TABS Take 1 tablet by mouth daily     • rosuvastatin (CRESTOR) 20 MG tablet Take 1 tablet (20 mg total) by mouth daily 90 tablet 1     No current facility-administered medications on file prior to visit  Allergies  No Known Allergies    Review of Systems   Constitutional: Negative for activity change, appetite change and fever  HENT: Negative for ear discharge, ear pain, rhinorrhea, sore throat and trouble swallowing  Eyes: Negative for photophobia, pain and redness  Respiratory: Negative for apnea, cough, chest tightness, shortness of breath and stridor  Cardiovascular: Negative for chest pain and palpitations  Gastrointestinal: Negative for abdominal distention, abdominal pain, nausea and vomiting  Endocrine: Negative for cold intolerance and heat intolerance  Genitourinary: Negative  Musculoskeletal: Negative for arthralgias, back pain, neck pain and neck stiffness  Skin: Positive for wound  Neurological: Negative for dizziness, weakness, light-headedness and numbness  Hematological: Negative  Objective  Vitals:    03/14/23 1410   Temp: (!) 97 4 °F (36 3 °C)       Physical Exam  Vitals reviewed  Constitutional:       Appearance: Normal appearance  HENT:      Head: Normocephalic and atraumatic  Cardiovascular:      Rate and Rhythm: Normal rate and regular rhythm  Pulmonary:      Effort: Pulmonary effort is normal       Breath sounds: Normal breath sounds  Abdominal:      General: There is no distension  Palpations: Abdomen is soft  Tenderness: There is no abdominal tenderness  There is no guarding  Skin:     Comments: Left lower extremity lacerations with sutures in place  Well approximated   No evidence of infection  Hematoma that is draining out the left lateral leg  Neurovascularly intact  No calf tenderness or swelling on examination  Neurological:      Mental Status: He is alert

## 2023-03-15 ENCOUNTER — OFFICE VISIT (OUTPATIENT)
Dept: CARDIOLOGY CLINIC | Facility: CLINIC | Age: 58
End: 2023-03-15

## 2023-03-15 VITALS
BODY MASS INDEX: 35.7 KG/M2 | OXYGEN SATURATION: 98 % | WEIGHT: 241 LBS | HEART RATE: 66 BPM | SYSTOLIC BLOOD PRESSURE: 130 MMHG | DIASTOLIC BLOOD PRESSURE: 86 MMHG | HEIGHT: 69 IN

## 2023-03-15 DIAGNOSIS — E78.5 DYSLIPIDEMIA: ICD-10-CM

## 2023-03-15 DIAGNOSIS — E66.9 OBESITY (BMI 30-39.9): ICD-10-CM

## 2023-03-15 DIAGNOSIS — I48.0 PAROXYSMAL ATRIAL FIBRILLATION (HCC): ICD-10-CM

## 2023-03-15 DIAGNOSIS — I10 ESSENTIAL HYPERTENSION: ICD-10-CM
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
## 2023-03-15 NOTE — ASSESSMENT & PLAN NOTE
- Laceration of left lower extremity repaired in the ED  - Here for suture removal  - All sutures removed at today's visit  - Patient tolerated well with no immediate complications  - Wound is well approximated with underlying hematoma  - Hematoma decreased in size  - No evidence of infection at visit   - recommended to continue to keep area clean  - Sutures replaced with steri strips  - May call back with questions or concerns

## 2023-05-03 PROBLEM — S81.812A LACERATIONS OF MULTIPLE SITES OF LEFT LEG: Status: RESOLVED | Noted: 2023-03-04 | Resolved: 2023-05-03
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
## 2023-07-10 ENCOUNTER — OFFICE VISIT (OUTPATIENT)
Dept: CARDIOLOGY CLINIC | Facility: CLINIC | Age: 58
End: 2023-07-10
Payer: COMMERCIAL

## 2023-07-10 VITALS
OXYGEN SATURATION: 96 % | SYSTOLIC BLOOD PRESSURE: 130 MMHG | HEART RATE: 59 BPM | DIASTOLIC BLOOD PRESSURE: 80 MMHG | HEIGHT: 69 IN | WEIGHT: 242 LBS | BODY MASS INDEX: 35.84 KG/M2

## 2023-07-10 DIAGNOSIS — I48.0 PAROXYSMAL ATRIAL FIBRILLATION (HCC): ICD-10-CM

## 2023-07-10 DIAGNOSIS — E66.9 OBESITY (BMI 30-39.9): ICD-10-CM

## 2023-07-10 DIAGNOSIS — Z01.810 PREOP CARDIOVASCULAR EXAM: ICD-10-CM

## 2023-07-10 DIAGNOSIS — E78.5 DYSLIPIDEMIA: ICD-10-CM

## 2023-07-10 DIAGNOSIS — I10 ESSENTIAL HYPERTENSION: ICD-10-CM

## 2023-07-10 PROCEDURE — 99214 OFFICE O/P EST MOD 30 MIN: CPT | Performed by: INTERNAL MEDICINE

## 2023-07-10 PROCEDURE — 93000 ELECTROCARDIOGRAM COMPLETE: CPT | Performed by: INTERNAL MEDICINE

## 2023-07-10 RX ORDER — LISINOPRIL AND HYDROCHLOROTHIAZIDE 20; 12.5 MG/1; MG/1
1 TABLET ORAL DAILY
COMMUNITY
Start: 2023-06-21
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
## 2023-09-18 DIAGNOSIS — I48.0 PAROXYSMAL ATRIAL FIBRILLATION (HCC): ICD-10-CM

## 2023-09-18 RX ORDER — METOPROLOL TARTRATE 50 MG/1
50 TABLET, FILM COATED ORAL EVERY 12 HOURS
Qty: 180 TABLET | Refills: 3 | Status: SHIPPED | OUTPATIENT
Start: 2023-09-18

## 2023-10-17 ENCOUNTER — HOSPITAL ENCOUNTER (OUTPATIENT)
Dept: RADIOLOGY | Age: 58
Discharge: HOME | End: 2023-10-17

## 2023-10-17 DIAGNOSIS — C73 MALIGNANT NEOPLASM OF THYROID GLAND (HCC): ICD-10-CM

## 2023-11-15 ENCOUNTER — HOSPITAL ENCOUNTER (OUTPATIENT)
Dept: RADIOLOGY | Facility: HOSPITAL | Age: 58
Discharge: HOME | End: 2023-11-15
Payer: COMMERCIAL

## 2023-11-15 DIAGNOSIS — C73 MALIGNANT NEOPLASM OF THYROID GLAND (HCC): ICD-10-CM

## 2023-11-15 PROCEDURE — A9509 IODINE I-123 SOD IODIDE MIL: HCPCS

## 2023-11-15 PROCEDURE — 96372 THER/PROPH/DIAG INJ SC/IM: CPT

## 2023-11-15 PROCEDURE — 78018 THYROID MET IMAGING BODY: CPT

## 2023-11-15 RX ADMIN — THYROTROPIN ALFA 0.9 MG: 0.9 INJECTION, POWDER, FOR SOLUTION INTRAMUSCULAR at 08:15

## 2023-11-16 ENCOUNTER — HOSPITAL ENCOUNTER (OUTPATIENT)
Dept: RADIOLOGY | Facility: HOSPITAL | Age: 58
Discharge: HOME | End: 2023-11-16
Payer: COMMERCIAL

## 2023-11-16 RX ADMIN — THYROTROPIN ALFA 0.9 MG: 0.9 INJECTION, POWDER, FOR SOLUTION INTRAMUSCULAR at 08:08

## 2023-11-17 ENCOUNTER — HOSPITAL ENCOUNTER (OUTPATIENT)
Dept: RADIOLOGY | Facility: HOSPITAL | Age: 58
Discharge: HOME | End: 2023-11-17
Payer: COMMERCIAL

## 2023-11-17 ENCOUNTER — APPOINTMENT (OUTPATIENT)
Dept: LAB | Facility: HOSPITAL | Age: 58
End: 2023-11-17
Payer: COMMERCIAL

## 2023-11-17 DIAGNOSIS — C73 MALIGNANT NEOPLASM OF THYROID GLAND (HCC): ICD-10-CM

## 2023-11-17 PROCEDURE — A9517 I131 IODIDE CAP, RX: HCPCS

## 2023-11-17 PROCEDURE — 79005 NUCLEAR RX ORAL ADMIN: CPT

## 2023-11-22 ENCOUNTER — HOSPITAL ENCOUNTER (OUTPATIENT)
Dept: RADIOLOGY | Facility: HOSPITAL | Age: 58
Discharge: HOME | End: 2023-11-22
Payer: COMMERCIAL

## 2023-11-22 DIAGNOSIS — C73 MALIGNANT NEOPLASM OF THYROID GLAND (HCC): ICD-10-CM

## 2023-11-22 PROCEDURE — 78018 THYROID MET IMAGING BODY: CPT

## 2024-05-13 ENCOUNTER — OFFICE VISIT (OUTPATIENT)
Dept: CARDIOLOGY CLINIC | Facility: CLINIC | Age: 59
End: 2024-05-13
Payer: COMMERCIAL

## 2024-05-13 VITALS
SYSTOLIC BLOOD PRESSURE: 120 MMHG | WEIGHT: 242 LBS | DIASTOLIC BLOOD PRESSURE: 70 MMHG | HEART RATE: 70 BPM | OXYGEN SATURATION: 98 % | BODY MASS INDEX: 35.84 KG/M2 | HEIGHT: 69 IN

## 2024-05-13 DIAGNOSIS — I48.91 ATRIAL FIBRILLATION WITH RAPID VENTRICULAR RESPONSE (HCC): Primary | ICD-10-CM

## 2024-05-13 DIAGNOSIS — E78.5 HYPERLIPIDEMIA, UNSPECIFIED HYPERLIPIDEMIA TYPE: ICD-10-CM

## 2024-05-13 DIAGNOSIS — I48.0 PAROXYSMAL ATRIAL FIBRILLATION (HCC): ICD-10-CM

## 2024-05-13 DIAGNOSIS — I10 ESSENTIAL HYPERTENSION: ICD-10-CM

## 2024-05-13 DIAGNOSIS — E78.5 DYSLIPIDEMIA: ICD-10-CM

## 2024-05-13 PROCEDURE — 93000 ELECTROCARDIOGRAM COMPLETE: CPT | Performed by: PHYSICIAN ASSISTANT

## 2024-05-13 PROCEDURE — 99214 OFFICE O/P EST MOD 30 MIN: CPT | Performed by: PHYSICIAN ASSISTANT

## 2024-05-13 RX ORDER — ATORVASTATIN CALCIUM 20 MG/1
20 TABLET, FILM COATED ORAL DAILY
COMMUNITY
Start: 2024-03-12 | End: 2024-05-13

## 2024-05-13 RX ORDER — CALCITRIOL 0.5 UG/1
CAPSULE, LIQUID FILLED ORAL
COMMUNITY
Start: 2023-09-04

## 2024-05-13 RX ORDER — LEVOTHYROXINE SODIUM 175 UG/1
TABLET ORAL
COMMUNITY
Start: 2023-09-11

## 2024-05-13 NOTE — PROGRESS NOTES
Progress Note - Cardiology Office  [facility]    Edward Blake 58 y.o. male MRN: [id number]  : 1965  Encounter: [id number]      Assessment:     Paroxysmal atrial fibrillation.  Essential hypertension.  Dyslipidemia.  Thyroid cancer.   Hypothyroidism.   Class II obesity.       Discussion Summary and Plan:    Paroxysmal atrial fibrillation.  - Patient is in sinus rhythm during today's office visit.   - 24 EKG: sinus rhythm, rate 70 bpm.   - Continue lopressor 50 mg twice daily.   - [id number] stroke risk score: 1 point, moderate-high risk.   - Currently on aspirin 81 mg daily.   - 20 stress test only, exercise: The stress ECG was negative for ischemia.   - 20 TTE: LVEF 50-55%.  Left atrium mildly to moderately dilated.  Right atrium mildly dilated.  Mitral valve with mild to moderate regurgitation.  Trace tricuspid valve regurgitation.    Essential hypertension.  - BP during today's office visit, 120/70.   - Continue lisinopril-HCTZ 20-12.5 mg daily and lopressor 50 mg twice daily.   - 20 TTE: LVEF 50-55%.  Left atrium mildly to moderately dilated.  Right atrium mildly dilated.  Mitral valve with mild to moderate regurgitation.  Trace tricuspid valve regurgitation.  - Patient reports recent blood work with PCP at [facility], asked that copy be faxed to our office.     Dyslipidemia.  - Currently on Crestor 20 mg daily.   - 20 lipid panel: cholesterol 102, triglycerides 91, HDL 34, LDL 50.   - Patient reports recent blood work with PCP at [facility], asked that copy be faxed to our office.     Thyroid cancer.   - s/p thyroidectomy (R- 23), L- 23).   - Underwent radiation therapy.   - Follows outpatient with [facility].     Hypothyroidism.   - s/p thyroidectomy.   - Care per PCP, follows with [facility].      Class II obesity.   - BMI 35.74 kg/m2.       Patient / Caretaker was advised and educated to call our office  immediately if  patient has any  new symptoms of chest pain/shortness of breath, near-syncope, syncope, light headedness sustained palpitations  or any other cardiovascular symptoms before their scheduled follow-up appointment.  Office number was provided #[phone].    Please call [phone] if any questions.  Counseling :    A description of the counseling.  Goals and Barriers.  Patient's ability to self care: Yes  Medication side effect reviewed with patient in detail and all their questions answered to their satisfaction.    HPI :     Edward Blake is a 58 y.o. male with PMHx of paroxysmal atrial fibrillation (on aspirin), essential hypertension, dyslipidemia, thyroid cancer s/p thyroidectomy (R- 23), L- 23), hypothyroidism, who presents for routine outpatient cardiology follow up.    Patient was last seen in outpatient cardiology office on 7/10/23. Since last office visit patient underwent thyroidectomy for thyroid cancer. Patient states he is overall doing well but periodically experiences right neck pain. He denies experiencing chest pain, palpitations, shortness of breath at rest or with exertion, lower extremity edema, orthopnea, lightheadedness, dizziness, nausea or vomiting.     Review of Systems   All other systems reviewed and are negative.      Historical Information   Past Medical History:   Diagnosis Date    High cholesterol     Hyperlipidemia     Hypertension      Past Surgical History:   Procedure Laterality Date    HERNIA REPAIR      KNEE SURGERY      TONSILLECTOMY       Social History     Substance and Sexual Activity   Alcohol Use Not Currently    Comment: soc     Social History     Substance and Sexual Activity   Drug Use Never     Social History     Tobacco Use   Smoking Status Former    Current packs/day: 0.00    Average packs/day: 1 pack/day for 6.0 years (6.0 ttl pk-yrs)    Types: Cigarettes    Start date: 1994    Quit date: 2000    Years since quittin.8   Smokeless Tobacco Never   Tobacco  "Comments    15 years ago     Family History:   Family History   Problem Relation Age of Onset    Heart disease Father        Meds/Allergies     No Known Allergies    Current Outpatient Medications:     aspirin 81 mg chewable tablet, Chew 2 tablets (162 mg total) daily, Disp: 100 tablet, Rfl: 2    calcitriol (ROCALTROL) 0.5 MCG capsule, , Disp: , Rfl:     levothyroxine 175 mcg tablet, , Disp: , Rfl:     lisinopril-hydrochlorothiazide (PRINZIDE,ZESTORETIC) 20-12.5 MG per tablet, Take 1 tablet by mouth daily, Disp: , Rfl:     metoprolol tartrate (LOPRESSOR) 50 mg tablet, Take 1 tablet (50 mg total) by mouth every 12 (twelve) hours Patient takes 50MG in the morning, and 25mg PM., Disp: 180 tablet, Rfl: 3    multivitamin (THERAGRAN) TABS, Take 1 tablet by mouth daily, Disp: , Rfl:     rosuvastatin (CRESTOR) 20 MG tablet, Take 1 tablet (20 mg total) by mouth daily, Disp: 90 tablet, Rfl: 1    Vitals: Blood pressure 120/70, pulse 70, height 5' 9\" (1.753 m), weight 110 kg (242 lb), SpO2 98%.    Body mass index is 35.74 kg/m².  Wt Readings from Last 3 Encounters:   05/13/24 110 kg (242 lb)   07/10/23 110 kg (242 lb)   03/15/23 109 kg (241 lb)     Vitals:    05/13/24 1536   Weight: 110 kg (242 lb)     BP Readings from Last 3 Encounters:   05/13/24 120/70   07/10/23 130/80   03/15/23 130/86       Physical Exam:  Physical Exam  Vitals reviewed.   Constitutional:       General: He is not in acute distress.     Appearance: He is obese.   Cardiovascular:      Rate and Rhythm: Normal rate and regular rhythm.      Pulses: Normal pulses.      Heart sounds: Murmur heard.   Pulmonary:      Effort: Pulmonary effort is normal. No respiratory distress.      Breath sounds: Normal breath sounds.   Abdominal:      General: Abdomen is flat. There is no distension.      Palpations: Abdomen is soft.      Tenderness: There is no abdominal tenderness.   Musculoskeletal:      Right lower leg: No edema.      Left lower leg: No edema.   Skin:     " General: Skin is warm and dry.   Neurological:      Mental Status: He is alert.           Diagnostic Studies Review Cardio:      EK/13/24 EKG: sinus rhythm, rate 70 bpm.     Cardiac testing:   Stress test only, exercise   Result date: 20    REST ECG: Normal sinus rhythm.     PROCEDURE: Treadmill exercise testing was performed, using the Sukhjinder protocol. Stress electrocardiographic evaluation was performed. The heart rate was 70, at the start of the study. Systolic blood pressure was 133 mmHg, at the start of  the study. Diastolic blood pressure was 92 mmHg, at the start of the study.     SUKHJINDER PROTOCOL:  HR bpm SBP mmHg DBP mmHg Symptoms Rhythm/conduct  Baseline 70 133 92 none NSR  Stage 1 101 140 82 none sinus tach  Stage 2 113 160 88 none same as above  Stage 3 122 -- -- none --  Stage 4 144 -- -- none same as above  Stage 5 160 -- -- mild fatigue --  Immediate 137 182 90 subsiding --  Recovery 1 89 177 77 none --  Recovery 2 84 146 84 none --  No medications or fluids given.     STRESS SUMMARY: Duration of exercise was 13 min. The patient exercised to protocol stage 5. Maximal work rate was 17.2 METs. Functional capacity was normal. Maximal heart rate during stress was 162 bpm ( 96 % of maximal predicted heart  rate). The heart rate response to stress was normal. There was normal resting blood pressure with an appropriate response to stress. The rate-pressure product for the peak heart rate and blood pressure was 04430. There was no chest pain  during stress. The stress test was terminated due to achievement of target heart rate and mild fatigue.     pre sao2=97%  peak sao2=96% The stress ECG was negative for ischemia. There were no stress arrhythmias or conduction abnormalities.     SUMMARY:  -  Stress results: Duration of exercise was 13 min. Target heart rate was achieved. There was no chest pain during stress.  -  ECG conclusions: The stress ECG was negative for ischemia.     IMPRESSIONS: Normal  study.      Results for orders placed during the hospital encounter of 20    Echo complete with contrast if indicated    Narrative  [address] [id number] [phone]    Transthoracic Echocardiogram  2D, M-mode, Doppler, and Color Doppler    Study date:  2020    Patient: NALINI PRASAD  MR number: [id number]  Account number: [id number]  : 1965  Age: 54 years  Gender: Male  Status: Inpatient  Location: Bedside  Height: 69 in  Weight: 224.6 lb  BP: 129/ 66 mmHg    Indications: Atrial Fibrillation    Diagnoses: 427.31 - ATRIAL FIBRILLATION    Sonographer:  CLAUDETTE Salgado  Referring Physician:  Latia Vaughan MD  Group:  [facility]  Interpreting Physician:  Ashlee Martin MD    SUMMARY    LEFT VENTRICLE:  Systolic function was at the lower limits of normal. Ejection fraction was estimated in the range of 50 % to 55 % to be 55 %.  There were no regional wall motion abnormalities.  Wall thickness was mildly increased.  There was mild concentric hypertrophy.    RIGHT VENTRICLE:  The size was at the upper limits of normal.    LEFT ATRIUM:  The atrium was mildly to moderately dilated.    RIGHT ATRIUM:  The atrium was mildly dilated.    MITRAL VALVE:  There was mild to moderate regurgitation.    TRICUSPID VALVE:  There was trace regurgitation.  Estimated peak PA pressure was 25 mmHg.    IVC, HEPATIC VEINS:  The respirophasic change in diameter was more than 50%.    HISTORY: PRIOR HISTORY: HTN, A-Fib, HLD    PROCEDURE: The procedure was performed at the bedside. This was a routine study. The transthoracic approach was used. The study included complete 2D imaging, M-mode, complete spectral Doppler, and color Doppler. The heart rate was 92 bpm,  at the start of the study. Image quality was adequate.    LEFT VENTRICLE: Size was normal. Systolic function was at the lower limits of normal. Ejection fraction was estimated in  the range of 50 % to 55 % to be 55 %. There were no regional wall motion abnormalities. Wall thickness was mildly  increased. There was mild concentric hypertrophy. DOPPLER: The study was not technically sufficient to allow evaluation of LV diastolic function.    RIGHT VENTRICLE: The size was at the upper limits of normal. Systolic function was normal. Wall thickness was normal.    LEFT ATRIUM: The atrium was mildly to moderately dilated.    RIGHT ATRIUM: The atrium was mildly dilated.    MITRAL VALVE: There was normal leaflet separation. DOPPLER: The transmitral velocity was within the normal range. There was no evidence for stenosis. There was mild to moderate regurgitation.    AORTIC VALVE: The valve was trileaflet. Leaflets exhibited mildly increased thickness and normal cuspal separation. DOPPLER: Transaortic velocity was within the normal range. There was no evidence for stenosis. There was no regurgitation.    TRICUSPID VALVE: DOPPLER: There was trace regurgitation. Estimated peak PA pressure was 25 mmHg.    PULMONIC VALVE: DOPPLER: There was no significant regurgitation.    PERICARDIUM: There was no thickening or calcification. There was no pericardial effusion.    AORTA: The root exhibited normal size.    SYSTEMIC VEINS: IVC: The inferior vena cava was normal in size. The respirophasic change in diameter was more than 50%.    SYSTEM MEASUREMENT TABLES    2D mode  AoR Diam 2D: 3.2 cm  LA Diam (2D): 4.5 cm  LA/Ao (2D): 1.41  FS (2D Teich): 24.2 %  IVSd (2D): 1.31 cm  LVDEV: 92.9 cmï¾³  LVESV: 48.1 cmï¾³  LVIDd(2D): 4.51 cm  LVISd (2D): 3.42 cm  LVPWd (2D): 1.24 cm  SV (Teich): 44.8 cmï¾³    Apical four chamber  LVEF A4C: 49 %    Unspecified Scan Mode  MV Peak E Jae. Mean: 933 mm/s  MVA (PHT): 4.15 cmï¾²  PHT: 53 ms  Max P mm[Hg]  V Max: 2240 mm/s  Vmax: 2250 mm/s  RA Area: 19.2 cmï¾²  RA Volume: 56.3 cmï¾³  TAPSE: 2.1 cm    [employer] Accredited Echocardiography Laboratory    Prepared and  "electronically signed by    Ashlee Martin MD  Signed 27-Jun-2020 10:26:58      Lab Review   Lab Results   Component Value Date    WBC 8.47 03/04/2023    HGB 14.3 03/04/2023    HCT 42 03/04/2023    MCV 87 03/04/2023    RDW 12.7 03/04/2023     03/04/2023     BMP:  Lab Results   Component Value Date    SODIUM 136 03/04/2023    K 4.1 03/04/2023     03/04/2023    CO2 29 03/04/2023    BUN 16 03/04/2023    CREATININE 1.00 03/04/2023    GLUC 146 (H) 03/04/2023    CALCIUM 8.9 03/04/2023    EGFR 84 03/04/2023    MG 2.2 06/27/2020     LFT:  Lab Results   Component Value Date    AST 23 03/04/2023    ALT 32 03/04/2023    ALKPHOS 74 03/04/2023    TP 7.3 03/04/2023    ALB 4.0 03/04/2023      No components found for: \"TSH3\"  Lab Results   Component Value Date    HHK7AAPAVKKU 2.998 06/26/2020     Lipid Profile:   Lab Results   Component Value Date    CHOLESTEROL 102 06/27/2020    HDL 34 (L) 06/27/2020    LDLCALC 50 06/27/2020    TRIG 91 06/27/2020       Jade Glaser PA-C  "

## 2024-10-10 DIAGNOSIS — I48.0 PAROXYSMAL ATRIAL FIBRILLATION (HCC): ICD-10-CM

## 2024-10-11 RX ORDER — METOPROLOL TARTRATE 50 MG
TABLET ORAL
Qty: 135 TABLET | Refills: 1 | Status: SHIPPED | OUTPATIENT
Start: 2024-10-11

## 2024-12-16 ENCOUNTER — OFFICE VISIT (OUTPATIENT)
Dept: CARDIOLOGY CLINIC | Facility: CLINIC | Age: 59
End: 2024-12-16
Payer: COMMERCIAL

## 2024-12-16 VITALS
BODY MASS INDEX: 36.14 KG/M2 | SYSTOLIC BLOOD PRESSURE: 120 MMHG | HEART RATE: 72 BPM | OXYGEN SATURATION: 98 % | WEIGHT: 244 LBS | HEIGHT: 69 IN | DIASTOLIC BLOOD PRESSURE: 70 MMHG

## 2024-12-16 DIAGNOSIS — I48.0 PAROXYSMAL ATRIAL FIBRILLATION (HCC): ICD-10-CM

## 2024-12-16 DIAGNOSIS — E66.9 OBESITY (BMI 30-39.9): ICD-10-CM

## 2024-12-16 DIAGNOSIS — E78.5 DYSLIPIDEMIA: ICD-10-CM

## 2024-12-16 DIAGNOSIS — I11.9 HYPERTENSIVE HEART DISEASE WITHOUT HEART FAILURE: ICD-10-CM

## 2024-12-16 PROCEDURE — 93000 ELECTROCARDIOGRAM COMPLETE: CPT | Performed by: INTERNAL MEDICINE

## 2024-12-16 PROCEDURE — 99214 OFFICE O/P EST MOD 30 MIN: CPT | Performed by: INTERNAL MEDICINE

## 2024-12-16 RX ORDER — PANTOPRAZOLE SODIUM 40 MG/1
TABLET, DELAYED RELEASE ORAL
COMMUNITY
Start: 2024-12-09
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
## 2025-01-03 ENCOUNTER — RESULTS FOLLOW-UP (OUTPATIENT)
Dept: CARDIOLOGY CLINIC | Facility: CLINIC | Age: 60
End: 2025-01-03

## 2025-01-03 LAB
ALBUMIN SERPL-MCNC: 4.4 G/DL (ref 3.8–4.9)
ALP SERPL-CCNC: 106 IU/L (ref 44–121)
ALT SERPL-CCNC: 26 IU/L (ref 0–44)
AST SERPL-CCNC: 18 IU/L (ref 0–40)
BASOPHILS # BLD AUTO: 0 X10E3/UL (ref 0–0.2)
BASOPHILS NFR BLD AUTO: 1 %
BILIRUB SERPL-MCNC: 0.6 MG/DL (ref 0–1.2)
BUN SERPL-MCNC: 15 MG/DL (ref 6–24)
BUN/CREAT SERPL: 14 (ref 9–20)
CALCIUM SERPL-MCNC: 7.9 MG/DL (ref 8.7–10.2)
CHLORIDE SERPL-SCNC: 104 MMOL/L (ref 96–106)
CHOLEST SERPL-MCNC: 182 MG/DL (ref 100–199)
CO2 SERPL-SCNC: 22 MMOL/L (ref 20–29)
CREAT SERPL-MCNC: 1.11 MG/DL (ref 0.76–1.27)
EGFR: 77 ML/MIN/1.73
EOSINOPHIL # BLD AUTO: 0.3 X10E3/UL (ref 0–0.4)
EOSINOPHIL NFR BLD AUTO: 5 %
ERYTHROCYTE [DISTWIDTH] IN BLOOD BY AUTOMATED COUNT: 13.1 % (ref 11.6–15.4)
GLOBULIN SER-MCNC: 2.4 G/DL (ref 1.5–4.5)
GLUCOSE SERPL-MCNC: 125 MG/DL (ref 70–99)
HCT VFR BLD AUTO: 41.7 % (ref 37.5–51)
HDLC SERPL-MCNC: 43 MG/DL
HGB BLD-MCNC: 14.2 G/DL (ref 13–17.7)
IMM GRANULOCYTES # BLD: 0.1 X10E3/UL (ref 0–0.1)
IMM GRANULOCYTES NFR BLD: 1 %
LDLC SERPL CALC-MCNC: 117 MG/DL (ref 0–99)
LYMPHOCYTES # BLD AUTO: 1.6 X10E3/UL (ref 0.7–3.1)
LYMPHOCYTES NFR BLD AUTO: 25 %
MCH RBC QN AUTO: 29.5 PG (ref 26.6–33)
MCHC RBC AUTO-ENTMCNC: 34.1 G/DL (ref 31.5–35.7)
MCV RBC AUTO: 87 FL (ref 79–97)
MONOCYTES # BLD AUTO: 0.5 X10E3/UL (ref 0.1–0.9)
MONOCYTES NFR BLD AUTO: 8 %
NEUTROPHILS # BLD AUTO: 3.7 X10E3/UL (ref 1.4–7)
NEUTROPHILS NFR BLD AUTO: 60 %
PLATELET # BLD AUTO: 216 X10E3/UL (ref 150–450)
POTASSIUM SERPL-SCNC: 4.5 MMOL/L (ref 3.5–5.2)
PROT SERPL-MCNC: 6.8 G/DL (ref 6–8.5)
RBC # BLD AUTO: 4.82 X10E6/UL (ref 4.14–5.8)
SL AMB VLDL CHOLESTEROL CALC: 22 MG/DL (ref 5–40)
SODIUM SERPL-SCNC: 140 MMOL/L (ref 134–144)
TRIGL SERPL-MCNC: 124 MG/DL (ref 0–149)
TSH SERPL DL<=0.005 MIU/L-ACNC: 0.45 UIU/ML (ref 0.45–4.5)
WBC # BLD AUTO: 6.1 X10E3/UL (ref 3.4–10.8)

## 2025-01-03 NOTE — TELEPHONE ENCOUNTER
I spoke with patient, made aware of results.   He reports that he has been compliant with his cholesterol medication.  He also has an appt with his endocrinologist this month to discuss TSH.

## 2025-01-03 NOTE — TELEPHONE ENCOUNTER
----- Message from Ashlee Martin MD sent at 1/3/2025 12:43 PM EST -----  Patient's labs are acceptable except her TSH is slightly on the lower side he should contact his primary care doctor regarding that.  As compared to previous study cholesterol has increased.  Patient should be compliant with her cholesterol medications.  Cholesterol used to be 102 currently 182.  If he is 100% compliant then we may need to increase the dose.

## 2025-05-21 ENCOUNTER — NURSE TRIAGE (OUTPATIENT)
Age: 60
End: 2025-05-21

## 2025-05-21 DIAGNOSIS — I48.0 PAROXYSMAL ATRIAL FIBRILLATION (HCC): ICD-10-CM

## 2025-05-21 RX ORDER — METOPROLOL TARTRATE 50 MG
TABLET ORAL
Qty: 135 TABLET | Refills: 1 | Status: SHIPPED | OUTPATIENT
Start: 2025-05-21

## 2025-05-21 NOTE — TELEPHONE ENCOUNTER
"FOLLOW UP: N/A    REASON FOR CONVERSATION: Order clarification   [facility] pharmacy called to clarify a new order that was sent to the pharmacy for this patient. The Metoprolol 50mg order was sent over as 50mg every 12 hours with also taking it 50mg in the morning and then 25mg in the evening. Nikhil, will change it to say take 1 tablet in the morning and 1/2 tablet in the evening       DISPOSITION: Home Care    Reason for Disposition   Pharmacy calling with prescription question and triager answers question    Answer Assessment - Initial Assessment Questions  1. NAME of MEDICINE: \"What medicine(s) are you calling about?\"      Metoprolol Tartrate 50mg  2. QUESTION: \"What is your question?\" (e.g., double dose of medicine, side effect)      [facility] pharmacy called to clarify a new order that was sent to the pharmacy for this patient. The Metoprolol 50mg order was sent over as 50mg every 12 hours with also taking it 50mg in the morning and then 25mg in the evening. Nikhil, will change it to say take 1 tablet in the morning and 1/2 tablet in the evening     3. PRESCRIBER: \"Who prescribed the medicine?\" Reason: if prescribed by specialist, call should be referred to that group.      Ashlee Martin    Protocols used: Medication Question Call-Adult-[place]    "

## 2025-05-30 ENCOUNTER — OFFICE VISIT (OUTPATIENT)
Dept: OBGYN CLINIC | Facility: CLINIC | Age: 60
End: 2025-05-30
Payer: COMMERCIAL

## 2025-05-30 ENCOUNTER — APPOINTMENT (OUTPATIENT)
Dept: RADIOLOGY | Facility: CLINIC | Age: 60
End: 2025-05-30
Attending: ORTHOPAEDIC SURGERY
Payer: COMMERCIAL

## 2025-05-30 VITALS — WEIGHT: 242 LBS | BODY MASS INDEX: 35.84 KG/M2 | HEIGHT: 69 IN

## 2025-05-30 DIAGNOSIS — M25.561 PAIN IN BOTH KNEES, UNSPECIFIED CHRONICITY: ICD-10-CM

## 2025-05-30 DIAGNOSIS — M17.0 BILATERAL PRIMARY OSTEOARTHRITIS OF KNEE: Primary | ICD-10-CM

## 2025-05-30 DIAGNOSIS — M25.562 CHRONIC PAIN OF BOTH KNEES: ICD-10-CM

## 2025-05-30 DIAGNOSIS — M25.562 PAIN IN BOTH KNEES, UNSPECIFIED CHRONICITY: ICD-10-CM

## 2025-05-30 DIAGNOSIS — G89.29 CHRONIC PAIN OF BOTH KNEES: ICD-10-CM

## 2025-05-30 DIAGNOSIS — M25.561 CHRONIC PAIN OF BOTH KNEES: ICD-10-CM

## 2025-05-30 PROCEDURE — 20610 DRAIN/INJ JOINT/BURSA W/O US: CPT | Performed by: ORTHOPAEDIC SURGERY

## 2025-05-30 PROCEDURE — 73562 X-RAY EXAM OF KNEE 3: CPT

## 2025-05-30 PROCEDURE — 99204 OFFICE O/P NEW MOD 45 MIN: CPT | Performed by: ORTHOPAEDIC SURGERY

## 2025-05-30 RX ORDER — BUPIVACAINE HYDROCHLORIDE 5 MG/ML
2 INJECTION, SOLUTION EPIDURAL; INTRACAUDAL; PERINEURAL
Status: COMPLETED | OUTPATIENT
Start: 2025-05-30 | End: 2025-05-30

## 2025-05-30 RX ORDER — TRIAMCINOLONE ACETONIDE 40 MG/ML
80 INJECTION, SUSPENSION INTRA-ARTICULAR; INTRAMUSCULAR
Status: COMPLETED | OUTPATIENT
Start: 2025-05-30 | End: 2025-05-30

## 2025-05-30 RX ADMIN — BUPIVACAINE HYDROCHLORIDE 2 ML: 5 INJECTION, SOLUTION EPIDURAL; INTRACAUDAL; PERINEURAL at 14:15

## 2025-05-30 RX ADMIN — TRIAMCINOLONE ACETONIDE 80 MG: 40 INJECTION, SUSPENSION INTRA-ARTICULAR; INTRAMUSCULAR at 14:15
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
